# Patient Record
Sex: MALE | Race: WHITE | NOT HISPANIC OR LATINO | Employment: OTHER | URBAN - METROPOLITAN AREA
[De-identification: names, ages, dates, MRNs, and addresses within clinical notes are randomized per-mention and may not be internally consistent; named-entity substitution may affect disease eponyms.]

---

## 2017-03-16 ENCOUNTER — OFFICE VISIT (OUTPATIENT)
Dept: INTERNAL MEDICINE | Facility: CLINIC | Age: 82
End: 2017-03-16
Payer: MEDICARE

## 2017-03-16 VITALS
HEART RATE: 72 BPM | HEIGHT: 71 IN | WEIGHT: 250.25 LBS | SYSTOLIC BLOOD PRESSURE: 122 MMHG | DIASTOLIC BLOOD PRESSURE: 80 MMHG | BODY MASS INDEX: 35.03 KG/M2

## 2017-03-16 DIAGNOSIS — L72.9 CUTANEOUS CYST: Primary | ICD-10-CM

## 2017-03-16 DIAGNOSIS — I10 ESSENTIAL HYPERTENSION: ICD-10-CM

## 2017-03-16 DIAGNOSIS — M25.561 CHRONIC PAIN OF RIGHT KNEE: ICD-10-CM

## 2017-03-16 DIAGNOSIS — G89.29 CHRONIC PAIN OF RIGHT KNEE: ICD-10-CM

## 2017-03-16 DIAGNOSIS — L02.91 ABSCESS: ICD-10-CM

## 2017-03-16 PROCEDURE — 99999 PR PBB SHADOW E&M-EST. PATIENT-LVL III: CPT | Mod: PBBFAC,,, | Performed by: INTERNAL MEDICINE

## 2017-03-16 PROCEDURE — 99213 OFFICE O/P EST LOW 20 MIN: CPT | Mod: PBBFAC | Performed by: INTERNAL MEDICINE

## 2017-03-16 PROCEDURE — 99213 OFFICE O/P EST LOW 20 MIN: CPT | Mod: S$PBB,,, | Performed by: INTERNAL MEDICINE

## 2017-03-16 RX ORDER — CEPHALEXIN 500 MG/1
500 CAPSULE ORAL EVERY 12 HOURS
Qty: 14 CAPSULE | Refills: 0 | Status: SHIPPED | OUTPATIENT
Start: 2017-03-16 | End: 2017-03-23

## 2017-03-16 RX ORDER — ASPIRIN 81 MG/1
81 TABLET ORAL DAILY
COMMUNITY
End: 2018-04-19

## 2017-03-16 NOTE — PROGRESS NOTES
Subjective:       Patient ID: Derik Wolfe is a 81 y.o. male.    Chief Complaint: Recurrent Skin Infections    HPI Comments: HPI: 81-year-old comes in with a cyst or abscess or boil on the upper back.  He says it is just below an area that was previously treated with an incision and drainage.  About 2 days ago it started draining and now is a lot less swollen, less tender but still is mildly red.    Review of Systems   Constitutional: Negative for fatigue and fever.   Skin: Positive for wound (cyst abscess or boil on the upper back). Negative for rash.       Objective:      Physical Exam   Constitutional: He is oriented to person, place, and time. He appears well-developed and well-nourished.   HENT:   Head: Normocephalic and atraumatic.   Neurological: He is alert and oriented to person, place, and time.   Skin: Skin is warm and dry. No rash noted. There is erythema.   See photo   Psychiatric: He has a normal mood and affect. His behavior is normal.             Assessment:       1. Cutaneous cyst    2. Abscess    3. Essential hypertension    4. Chronic pain of right knee        Plan:       Derik was seen today for recurrent skin infections.    Diagnoses and all orders for this visit:    Cutaneous cyst    Abscess    Essential hypertension    Chronic pain of right knee    Other orders  -     cephALEXin (KEFLEX) 500 MG capsule; Take 1 capsule (500 mg total) by mouth every 12 (twelve) hours.        Call if symptoms fail to improve or worsen

## 2017-05-29 RX ORDER — ATENOLOL 25 MG/1
TABLET ORAL
Qty: 90 TABLET | Refills: 0 | Status: SHIPPED | OUTPATIENT
Start: 2017-05-29 | End: 2017-08-26 | Stop reason: SDUPTHER

## 2017-05-29 RX ORDER — ALLOPURINOL 300 MG/1
TABLET ORAL
Qty: 90 TABLET | Refills: 0 | Status: SHIPPED | OUTPATIENT
Start: 2017-05-29 | End: 2017-08-26 | Stop reason: SDUPTHER

## 2017-07-10 ENCOUNTER — OFFICE VISIT (OUTPATIENT)
Dept: DERMATOLOGY | Facility: CLINIC | Age: 82
End: 2017-07-10
Payer: MEDICARE

## 2017-07-10 DIAGNOSIS — L82.1 SK (SEBORRHEIC KERATOSIS): ICD-10-CM

## 2017-07-10 DIAGNOSIS — Z12.83 SKIN CANCER SCREENING: ICD-10-CM

## 2017-07-10 DIAGNOSIS — Z85.828 HISTORY OF NONMELANOMA SKIN CANCER: ICD-10-CM

## 2017-07-10 DIAGNOSIS — L57.0 AK (ACTINIC KERATOSIS): Primary | ICD-10-CM

## 2017-07-10 DIAGNOSIS — L81.4 LENTIGINES: ICD-10-CM

## 2017-07-10 PROCEDURE — 1159F MED LIST DOCD IN RCRD: CPT | Mod: ,,, | Performed by: DERMATOLOGY

## 2017-07-10 PROCEDURE — 99213 OFFICE O/P EST LOW 20 MIN: CPT | Mod: 25,S$PBB,, | Performed by: DERMATOLOGY

## 2017-07-10 PROCEDURE — 17004 DESTROY PREMAL LESIONS 15/>: CPT | Mod: PBBFAC | Performed by: DERMATOLOGY

## 2017-07-10 PROCEDURE — 99213 OFFICE O/P EST LOW 20 MIN: CPT | Mod: PBBFAC | Performed by: DERMATOLOGY

## 2017-07-10 PROCEDURE — 99999 PR PBB SHADOW E&M-EST. PATIENT-LVL III: CPT | Mod: PBBFAC,,, | Performed by: DERMATOLOGY

## 2017-07-10 PROCEDURE — 17004 DESTROY PREMAL LESIONS 15/>: CPT | Mod: S$PBB,,, | Performed by: DERMATOLOGY

## 2017-07-10 PROCEDURE — 1126F AMNT PAIN NOTED NONE PRSNT: CPT | Mod: ,,, | Performed by: DERMATOLOGY

## 2017-07-10 NOTE — PROGRESS NOTES
Subjective:       Patient ID:  Derik Wolfe is a 81 y.o. male who presents for   Chief Complaint   Patient presents with    Skin Check     UBSE     HPI  Patient has a history of non-melanoma skin cancer and is here today for routine skin check.   Interested in upper body skin check today.     Has used efudex in the past on arms and forehead.  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     Pt reports a scaly spot on his L temple which bled occasionally. He tried some efudex cream on it and it went away.    Review of Systems   Constitutional: Negative for fever, chills, weight loss, weight gain, fatigue, night sweats and malaise.   Skin: Negative for daily sunscreen use and recent sunburn.   Hematologic/Lymphatic: Bruises/bleeds easily.        Objective:    Physical Exam   Constitutional: He appears well-developed and well-nourished. No distress.   Neurological: He is alert and oriented to person, place, and time. He is not disoriented.   Psychiatric: He has a normal mood and affect.   Skin:   Areas Examined (abnormalities noted in diagram):   Scalp / Hair Palpated and Inspected  Head / Face Inspection Performed  Neck Inspection Performed  Chest / Axilla Inspection Performed  Abdomen Inspection Performed  Back Inspection Performed  RUE Inspected  LUE Inspection Performed                       Diagram Legend     Erythematous scaling macule/papule c/w actinic keratosis       Vascular papule c/w angioma      Pigmented verrucoid papule/plaque c/w seborrheic keratosis      Yellow umbilicated papule c/w sebaceous hyperplasia      Irregularly shaped tan macule c/w lentigo     1-2 mm smooth white papules consistent with Milia      Movable subcutaneous cyst with punctum c/w epidermal inclusion cyst      Subcutaneous movable cyst c/w pilar cyst      Firm pink to brown papule c/w dermatofibroma      Pedunculated fleshy papule(s) c/w skin tag(s)      Evenly pigmented macule c/w junctional  nevus     Mildly variegated pigmented, slightly irregular-bordered macule c/w mildly atypical nevus      Flesh colored to evenly pigmented papule c/w intradermal nevus       Pink pearly papule/plaque c/w basal cell carcinoma      Erythematous hyperkeratotic cursted plaque c/w SCC      Surgical scar with no sign of skin cancer recurrence      Open and closed comedones      Inflammatory papules and pustules      Verrucoid papule consistent consistent with wart     Erythematous eczematous patches and plaques     Dystrophic onycholytic nail with subungual debris c/w onychomycosis     Umbilicated papule    Erythematous-base heme-crusted tan verrucoid plaque consistent with inflamed seborrheic keratosis     Erythematous Silvery Scaling Plaque c/w Psoriasis     See annotation      Assessment / Plan:        AK (actinic keratosis)  Cryosurgery Procedure Note    Verbal consent from the patient is obtained and the patient is aware of the precancerous quality and need for treatment of these lesions. Liquid nitrogen cryosurgery is applied to the 18 actinic keratoses, as detailed in the physical exam, to produce a freeze injury. The patient is aware that blisters may form and is instructed on wound care with gentle cleansing and use of vaseline ointment to keep moist until healed. The patient is supplied a handout on cryosurgery and is instructed to call if lesions do not completely resolve.    SK (seborrheic keratosis)  These are benign inherited growths without a malignant potential. Reassurance given to patient. No treatment is necessary.   Treatment of benign, asymptomatic lesions may be considered cosmetic.  Warned about risk of hypo- or hyperpigmentation with treatment and risk of recurrence.    Lentigines  These are benign sun spots which should be monitored for changes. Patient instructed in importance of daily broad spectrum sunscreen use with spf at least 30. Sun avoidance and topical protection/protective clothing  discussed.    Skin cancer screening and History of nonmelanoma skin cancer  Upper body skin examination performed today including at least 6 points as noted in physical examination. No lesions suspicious for malignancy noted.  Patient instructed in importance of daily broad spectrum sunscreen use with spf at least 30. Sun avoidance and topical protection/protective clothing discussed.      Return in about 6 months (around 1/10/2018).

## 2017-07-10 NOTE — PATIENT INSTRUCTIONS
SEBORRHEIC KERATOSES        What causes seborrheic keratoses?    Seborrheic keratoses are harmless, common skin growths that first appear during adult life.  As time goes by, more growths appear.  Some persons have a very large number of them.  Seborrheic keratoses appear on both covered and uncovered parts of the body; they are not caused by sunlight.  The tendency to develop seborrheic keratoses is inherited.    Seborrheic keratoses are harmless and never become malignant.  They begin as slightly raised, light brown spots.  Gradually they thicken and take on a rough wartlike surface.  They slowly darken and may turn black.  These color changes are harmless.  Seborrheic keratoses are superficial and look as if they were stuck on the skin.  Persons who have had several seborrheic keratoses can usually recognize this type of benign growth.  However, if you are concerned or unsure about any growth, consult me.    Treatment    Seborrheic keratoses can easily be removed in the office.  The only reason for removing a seborrheic keratosis is your wish to get rid of it.      CRYOSURGERY      Your doctor has used a method called cryosurgery to treat your skin condition. Cryosurgery refers to the use of very cold substances to treat a variety of skin conditions such as warts, pre-skin cancers, molluscum contagiosum, sun spots, and several benign growths. The substance we use in cryosurgery is liquid nitrogen and is so cold (-195 degrees Celsius) that is burns when administered.     Following treatment in the office, the skin may immediately burn and become red. You may find the area around the lesion is affected as well. It is sometimes necessary to treat not only the lesion, but a small area of the surrounding normal skin to achieve a good response.     A blister, and even a blood filled blister, may form after treatment.   This is a normal response. If the blister is painful, it is acceptable to sterilize a needle and with  rubbing alcohol and gently pop the blister. It is important that you gently wash the area with soap and warm water as the blister fluid may contain wart virus if a wart was treated. Do no remove the roof of the blister.     The area treated can take anywhere from 1-3 weeks to heal. Healing time depends on the kind skin lesion treated, the location, and how aggressively the lesion was treated. It is recommended that the areas treated are covered with Vaseline or bacitracin ointment and a band-aid. If a band-aid is not practical, just ointment applied several times per day will do. Keeping these areas moist will speed the healing time.    Treatment with liquid nitrogen can leave a scar. In dark skin, it may be a light or dark scar, in light skin it may be a white or pink scar. These will generally fade with time, but may never go away completely.     If you have any concerns after your treatment, please feel free to call the office.       [address] [place] [address]/ [phone] FAX/ [email or web address]    Summer Sun Protection      The [facility] would like to remind you of the importance of sun protection all year round and particularly during the summer when the suns rays are the strongest. It has been proven that both acute and chronic sun exposure damages our cells and leads to skin cancer. Beyond skin cancer, the sun causes 90% of the symptoms of pre-mature skin aging, including wrinkles, lentigines (brown spots), and thin, easily bruised skin. Proper sun protection can help prevent these unwanted conditions.    Many patients report that the dont go in the sun. It has been shown that the average person receives 18 hours of incidental sun exposure per week during activities such as walking through parking lots, driving, or sitting next to windows. This accumulates to several bad sunburns per year!    In choosing sunscreen, you want one that protects against  both UVA and UVB rays. It is recommended that you use one of SPF 30 or higher. It is important to apply the sunscreen about 20 minutes prior to sun exposure. Most sunscreens are chemical sunscreens and a reaction must take place in the skin so that they are effective. If they are applied and then you are immediately exposed to the sun or start sweating, this reaction has not had time to take place and you are therefore unprotected. Sunscreen needs to be reapplied every 2 hours if you are participating in water sports or sweating. We recommend Elta MD or Neutrogena Ultra Sheer Dry Touch SPF 55 for daily use; however there are many options and it is most important for you to find one that you will use on a consistent basis.    If you have sensitive skin, you may do best with a sunscreen that contains only physical blockers such as titanium dioxide or zinc oxide. These are typically thicker and harder to apply, however they afford very good protection. Neutrogena Sensitive Skin, Blue Lizard Sensitive Skin (pink top) or Neutrogena Pure and Free are popular ones.     Aside from sunscreen, clothes with UV protection, wide brimmed hats, and sunglasses are other means of sun protection that we recommend.                        [facility] - DERMATOLOGY  [address]  [place] [address]  Dept: [phone]  Dept Fax: [phone]

## 2017-08-27 RX ORDER — ATENOLOL 25 MG/1
TABLET ORAL
Qty: 90 TABLET | Refills: 0 | Status: SHIPPED | OUTPATIENT
Start: 2017-08-27 | End: 2017-08-29

## 2017-08-27 RX ORDER — ALLOPURINOL 300 MG/1
TABLET ORAL
Qty: 90 TABLET | Refills: 0 | Status: SHIPPED | OUTPATIENT
Start: 2017-08-27 | End: 2017-11-29 | Stop reason: SDUPTHER

## 2017-08-29 ENCOUNTER — TELEPHONE (OUTPATIENT)
Dept: INTERNAL MEDICINE | Facility: CLINIC | Age: 82
End: 2017-08-29

## 2017-08-29 RX ORDER — METOPROLOL SUCCINATE 25 MG/1
25 TABLET, EXTENDED RELEASE ORAL DAILY
Qty: 30 TABLET | Refills: 11 | Status: SHIPPED | OUTPATIENT
Start: 2017-08-29 | End: 2018-06-04

## 2017-11-29 ENCOUNTER — PATIENT MESSAGE (OUTPATIENT)
Dept: INTERNAL MEDICINE | Facility: CLINIC | Age: 82
End: 2017-11-29

## 2017-11-29 RX ORDER — ALLOPURINOL 300 MG/1
TABLET ORAL
Qty: 90 TABLET | Refills: 0 | Status: SHIPPED | OUTPATIENT
Start: 2017-11-29 | End: 2017-11-29 | Stop reason: SDUPTHER

## 2017-11-29 RX ORDER — ALLOPURINOL 300 MG/1
300 TABLET ORAL DAILY
Qty: 90 TABLET | Refills: 0 | Status: SHIPPED | OUTPATIENT
Start: 2017-11-29 | End: 2017-11-29 | Stop reason: SDUPTHER

## 2017-11-29 RX ORDER — ALLOPURINOL 300 MG/1
300 TABLET ORAL DAILY
Qty: 90 TABLET | Refills: 0 | Status: SHIPPED | OUTPATIENT
Start: 2017-11-29 | End: 2018-06-04 | Stop reason: SDUPTHER

## 2018-01-08 ENCOUNTER — CLINICAL SUPPORT (OUTPATIENT)
Dept: URGENT CARE | Facility: CLINIC | Age: 83
End: 2018-01-08
Payer: MEDICARE

## 2018-01-08 DIAGNOSIS — Z23 INFLUENZA VACCINE ADMINISTERED: Primary | ICD-10-CM

## 2018-01-08 PROCEDURE — 90662 IIV NO PRSV INCREASED AG IM: CPT | Mod: S$GLB,,, | Performed by: INTERNAL MEDICINE

## 2018-01-08 PROCEDURE — G0008 ADMIN INFLUENZA VIRUS VAC: HCPCS | Mod: S$GLB,,, | Performed by: INTERNAL MEDICINE

## 2018-02-01 ENCOUNTER — OFFICE VISIT (OUTPATIENT)
Dept: DERMATOLOGY | Facility: CLINIC | Age: 83
End: 2018-02-01
Payer: MEDICARE

## 2018-02-01 DIAGNOSIS — L82.1 SK (SEBORRHEIC KERATOSIS): ICD-10-CM

## 2018-02-01 DIAGNOSIS — D18.00 ANGIOMA: ICD-10-CM

## 2018-02-01 DIAGNOSIS — L72.0 MILIA: ICD-10-CM

## 2018-02-01 DIAGNOSIS — L57.0 AK (ACTINIC KERATOSIS): Primary | ICD-10-CM

## 2018-02-01 DIAGNOSIS — Z12.83 SKIN CANCER SCREENING: ICD-10-CM

## 2018-02-01 PROCEDURE — 99999 PR PBB SHADOW E&M-EST. PATIENT-LVL II: CPT | Mod: PBBFAC,,, | Performed by: DERMATOLOGY

## 2018-02-01 PROCEDURE — 99213 OFFICE O/P EST LOW 20 MIN: CPT | Mod: 25,S$PBB,, | Performed by: DERMATOLOGY

## 2018-02-01 PROCEDURE — 99212 OFFICE O/P EST SF 10 MIN: CPT | Mod: PBBFAC | Performed by: DERMATOLOGY

## 2018-02-01 PROCEDURE — 17004 DESTROY PREMAL LESIONS 15/>: CPT | Mod: PBBFAC | Performed by: DERMATOLOGY

## 2018-02-01 PROCEDURE — 17004 DESTROY PREMAL LESIONS 15/>: CPT | Mod: S$PBB,,, | Performed by: DERMATOLOGY

## 2018-02-01 PROCEDURE — 1126F AMNT PAIN NOTED NONE PRSNT: CPT | Mod: ,,, | Performed by: DERMATOLOGY

## 2018-02-01 PROCEDURE — 1159F MED LIST DOCD IN RCRD: CPT | Mod: ,,, | Performed by: DERMATOLOGY

## 2018-02-01 NOTE — PROGRESS NOTES
Subjective:       Patient ID:  Derik Wolfe is a 82 y.o. male who presents for   Chief Complaint   Patient presents with    Skin Check     UBSE     HPI  Patient has a history of non-melanoma skin cancer and is here today for routine skin check.   Interested in upper body skin check today.     Has used efudex in the past on arms and forehead.  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     Pt reports a bump on the right side of his nose since last visit. Asymptomatic and no prior treatment.    Review of Systems   Constitutional: Negative for fever, chills, weight loss, weight gain, fatigue, night sweats and malaise.   Skin: Negative for daily sunscreen use and recent sunburn.   Hematologic/Lymphatic: Bruises/bleeds easily.        Objective:    Physical Exam   Constitutional: He appears well-developed and well-nourished. No distress.   Neurological: He is alert and oriented to person, place, and time. He is not disoriented.   Psychiatric: He has a normal mood and affect.   Skin:   Areas Examined (abnormalities noted in diagram):   Scalp / Hair Palpated and Inspected  Head / Face Inspection Performed  Neck Inspection Performed  Chest / Axilla Inspection Performed  Abdomen Inspection Performed  Back Inspection Performed  RUE Inspected  LUE Inspection Performed                   Diagram Legend     Erythematous scaling macule/papule c/w actinic keratosis       Vascular papule c/w angioma      Pigmented verrucoid papule/plaque c/w seborrheic keratosis      Yellow umbilicated papule c/w sebaceous hyperplasia      Irregularly shaped tan macule c/w lentigo     1-2 mm smooth white papules consistent with Milia      Movable subcutaneous cyst with punctum c/w epidermal inclusion cyst      Subcutaneous movable cyst c/w pilar cyst      Firm pink to brown papule c/w dermatofibroma      Pedunculated fleshy papule(s) c/w skin tag(s)      Evenly pigmented macule c/w junctional nevus     Mildly  variegated pigmented, slightly irregular-bordered macule c/w mildly atypical nevus      Flesh colored to evenly pigmented papule c/w intradermal nevus       Pink pearly papule/plaque c/w basal cell carcinoma      Erythematous hyperkeratotic cursted plaque c/w SCC      Surgical scar with no sign of skin cancer recurrence      Open and closed comedones      Inflammatory papules and pustules      Verrucoid papule consistent consistent with wart     Erythematous eczematous patches and plaques     Dystrophic onycholytic nail with subungual debris c/w onychomycosis     Umbilicated papule    Erythematous-base heme-crusted tan verrucoid plaque consistent with inflamed seborrheic keratosis     Erythematous Silvery Scaling Plaque c/w Psoriasis     See annotation      Assessment / Plan:        AK (actinic keratosis)  Cryosurgery Procedure Note    Verbal consent from the patient is obtained and the patient is aware of the precancerous quality and need for treatment of these lesions. Liquid nitrogen cryosurgery is applied to the 23 actinic keratoses, as detailed in the physical exam, to produce a freeze injury. The patient is aware that blisters may form and is instructed on wound care with gentle cleansing and use of vaseline ointment to keep moist until healed. The patient is supplied a handout on cryosurgery and is instructed to call if lesions do not completely resolve.    Pt will consider using efudex again in the future, but preferred cryo today.    SK (seborrheic keratosis)  These are benign inherited growths without a malignant potential. Reassurance given to patient. No treatment is necessary.   Treatment of benign, asymptomatic lesions may be considered cosmetic.  Warned about risk of hypo- or hyperpigmentation with treatment and risk of recurrence.    Milia  This is a benign cyst. Reassurance provided. No treatment is necessary unless it is symptomatic. These may resolve on their own.    Angioma  This is a benign  vascular lesion. Reassurance given. No treatment required. Treatment of benign, asymptomatic lesions may be considered cosmetic.    Skin cancer screening  Upper body skin examination performed today including at least 6 points as noted in physical examination. No lesions suspicious for malignancy noted.  Patient instructed in importance of daily broad spectrum sunscreen use with spf at least 30. Sun avoidance and topical protection/protective clothing discussed.      Follow-up in about 4 months (around 6/1/2018) for skin check or sooner for any concerns.

## 2018-02-01 NOTE — PATIENT INSTRUCTIONS
CRYOSURGERY      Your doctor has used a method called cryosurgery to treat your skin condition. Cryosurgery refers to the use of very cold substances to treat a variety of skin conditions such as warts, pre-skin cancers, molluscum contagiosum, sun spots, and several benign growths. The substance we use in cryosurgery is liquid nitrogen and is so cold (-195 degrees Celsius) that is burns when administered.     Following treatment in the office, the skin may immediately burn and become red. You may find the area around the lesion is affected as well. It is sometimes necessary to treat not only the lesion, but a small area of the surrounding normal skin to achieve a good response.     A blister, and even a blood filled blister, may form after treatment.   This is a normal response. If the blister is painful, it is acceptable to sterilize a needle and with rubbing alcohol and gently pop the blister. It is important that you gently wash the area with soap and warm water as the blister fluid may contain wart virus if a wart was treated. Do no remove the roof of the blister.     The area treated can take anywhere from 1-3 weeks to heal. Healing time depends on the kind skin lesion treated, the location, and how aggressively the lesion was treated. It is recommended that the areas treated are covered with Vaseline or bacitracin ointment and a band-aid. If a band-aid is not practical, just ointment applied several times per day will do. Keeping these areas moist will speed the healing time.    Treatment with liquid nitrogen can leave a scar. In dark skin, it may be a light or dark scar, in light skin it may be a white or pink scar. These will generally fade with time, but may never go away completely.     If you have any concerns after your treatment, please feel free to call the office.       [address] [place] [address]/ [phone] FAX/ [email or web address]    Summer Sun Protection      The  [facility] would like to remind you of the importance of sun protection all year round and particularly during the summer when the suns rays are the strongest. It has been proven that both acute and chronic sun exposure damages our cells and leads to skin cancer. Beyond skin cancer, the sun causes 90% of the symptoms of pre-mature skin aging, including wrinkles, lentigines (brown spots), and thin, easily bruised skin. Proper sun protection can help prevent these unwanted conditions.    Many patients report that the dont go in the sun. It has been shown that the average person receives 18 hours of incidental sun exposure per week during activities such as walking through parking lots, driving, or sitting next to windows. This accumulates to several bad sunburns per year!    In choosing sunscreen, you want one that protects against both UVA and UVB rays. It is recommended that you use one of SPF 30 or higher. It is important to apply the sunscreen about 20 minutes prior to sun exposure. Most sunscreens are chemical sunscreens and a reaction must take place in the skin so that they are effective. If they are applied and then you are immediately exposed to the sun or start sweating, this reaction has not had time to take place and you are therefore unprotected. Sunscreen needs to be reapplied every 2 hours if you are participating in water sports or sweating. We recommend Elta MD or Neutrogena Ultra Sheer Dry Touch SPF 55 for daily use; however there are many options and it is most important for you to find one that you will use on a consistent basis.    If you have sensitive skin, you may do best with a sunscreen that contains only physical blockers such as titanium dioxide or zinc oxide. These are typically thicker and harder to apply, however they afford very good protection. Neutrogena Sensitive Skin, Blue Lizard Sensitive Skin (pink top) or Neutrogena Pure and Free are popular ones.      Aside from sunscreen, clothes with UV protection, wide brimmed hats, and sunglasses are other means of sun protection that we recommend.                        [facility] - DERMATOLOG[facility]  [address]  [facility] [address]  Dept: [phone]  Dept Fax: [phone]

## 2018-04-05 DIAGNOSIS — M25.561 PAIN IN BOTH KNEES, UNSPECIFIED CHRONICITY: Primary | ICD-10-CM

## 2018-04-05 DIAGNOSIS — M25.562 PAIN IN BOTH KNEES, UNSPECIFIED CHRONICITY: Primary | ICD-10-CM

## 2018-04-06 ENCOUNTER — OFFICE VISIT (OUTPATIENT)
Dept: INTERNAL MEDICINE | Facility: CLINIC | Age: 83
End: 2018-04-06
Payer: MEDICARE

## 2018-04-06 VITALS
BODY MASS INDEX: 35.22 KG/M2 | OXYGEN SATURATION: 95 % | HEIGHT: 71 IN | DIASTOLIC BLOOD PRESSURE: 64 MMHG | SYSTOLIC BLOOD PRESSURE: 122 MMHG | HEART RATE: 76 BPM | WEIGHT: 251.56 LBS

## 2018-04-06 DIAGNOSIS — H61.22 IMPACTED CERUMEN OF LEFT EAR: Primary | ICD-10-CM

## 2018-04-06 DIAGNOSIS — I10 ESSENTIAL HYPERTENSION: ICD-10-CM

## 2018-04-06 PROCEDURE — 99213 OFFICE O/P EST LOW 20 MIN: CPT | Mod: S$PBB,,, | Performed by: INTERNAL MEDICINE

## 2018-04-06 PROCEDURE — 99213 OFFICE O/P EST LOW 20 MIN: CPT | Mod: PBBFAC | Performed by: INTERNAL MEDICINE

## 2018-04-06 PROCEDURE — 99999 PR PBB SHADOW E&M-EST. PATIENT-LVL III: CPT | Mod: PBBFAC,,, | Performed by: INTERNAL MEDICINE

## 2018-04-06 RX ORDER — NEOMYCIN SULFATE, POLYMYXIN B SULFATE AND HYDROCORTISONE 10; 3.5; 1 MG/ML; MG/ML; [USP'U]/ML
3 SUSPENSION/ DROPS AURICULAR (OTIC) 4 TIMES DAILY
Qty: 10 ML | Refills: 1 | Status: SHIPPED | OUTPATIENT
Start: 2018-04-06

## 2018-04-06 NOTE — PROGRESS NOTES
Subjective:       Patient ID: Derik Wolfe is a 82 y.o. male.    Chief Complaint: Ear Fullness (wax in ears)     HPI: Pt c/o wax in his ear. 2 times in the past he had his ears cleaned by ENT. He was told to use Cortisporin Otic drops and they were able to clean the ears successfully. He could not get an appt for weeks.  He did not have any drops and the left ear is clogged. No fever or sore throat or any other issues.       Ear Fullness    Associated symptoms include hearing loss. Pertinent negatives include no abdominal pain, coughing, diarrhea, headaches, neck pain, rash, rhinorrhea or vomiting.     Review of Systems   Constitutional: Negative for activity change, chills, fatigue, fever and unexpected weight change.   HENT: Positive for hearing loss. Negative for nosebleeds, rhinorrhea and trouble swallowing.    Eyes: Negative for pain, discharge and visual disturbance.   Respiratory: Negative for cough, chest tightness, shortness of breath and wheezing.    Cardiovascular: Negative for chest pain and palpitations.   Gastrointestinal: Negative for abdominal pain, blood in stool, constipation, diarrhea, nausea and vomiting.   Endocrine: Negative for polydipsia and polyuria.   Genitourinary: Negative for difficulty urinating, hematuria and urgency.   Musculoskeletal: Negative for arthralgias, joint swelling and neck pain.   Skin: Negative for rash.   Neurological: Negative for dizziness, weakness and headaches.   Hematological: Does not bruise/bleed easily.   Psychiatric/Behavioral: Negative for confusion, dysphoric mood, sleep disturbance and suicidal ideas.       Objective:      Physical Exam   Constitutional: He appears well-developed and well-nourished.   HENT:   Head: Normocephalic and atraumatic.   Right Ear: External ear normal.   Mouth/Throat: Oropharynx is clear and moist.   Left ear completely occluded with wax.    Neck: Normal range of motion. Neck supple. No thyromegaly present.   Lymphadenopathy:      He has no cervical adenopathy.     I tried to remove the wax but it was entirely too hard and unable to be removed. Canal was mildly irritated.   Assessment:       1. Impacted cerumen of left ear    2. Essential hypertension        Plan:       Derik was seen today for ear fullness.    Diagnoses and all orders for this visit:    Impacted cerumen of left ear  -     Ambulatory referral to ENT    Essential hypertension    Other orders  -     neomycin-polymyxin-hydrocortisone (CORTISPORIN) 3.5-10,000-1 mg/mL-unit/mL-% otic suspension; Place 3 drops into the left ear 4 (four) times daily.     Restart cortisporin a few days and see if that helps or if the ear needs to be cleaned here or in ENT>

## 2018-04-09 ENCOUNTER — OFFICE VISIT (OUTPATIENT)
Dept: ORTHOPEDICS | Facility: CLINIC | Age: 83
End: 2018-04-09
Payer: MEDICARE

## 2018-04-09 ENCOUNTER — HOSPITAL ENCOUNTER (OUTPATIENT)
Dept: RADIOLOGY | Facility: HOSPITAL | Age: 83
Discharge: HOME | End: 2018-04-09
Attending: ORTHOPAEDIC SURGERY
Payer: MEDICARE

## 2018-04-09 VITALS — WEIGHT: 248.44 LBS | BODY MASS INDEX: 34.78 KG/M2 | HEIGHT: 71 IN

## 2018-04-09 DIAGNOSIS — M25.561 PAIN IN BOTH KNEES, UNSPECIFIED CHRONICITY: ICD-10-CM

## 2018-04-09 DIAGNOSIS — M17.11 PRIMARY OSTEOARTHRITIS OF RIGHT KNEE: Primary | ICD-10-CM

## 2018-04-09 DIAGNOSIS — M25.562 PAIN IN BOTH KNEES, UNSPECIFIED CHRONICITY: ICD-10-CM

## 2018-04-09 PROCEDURE — 99999 PR PBB SHADOW E&M-EST. PATIENT-LVL II: CPT | Mod: PBBFAC,,, | Performed by: ORTHOPAEDIC SURGERY

## 2018-04-09 PROCEDURE — 73562 X-RAY EXAM OF KNEE 3: CPT | Mod: 26,50,, | Performed by: RADIOLOGY

## 2018-04-09 PROCEDURE — 73562 X-RAY EXAM OF KNEE 3: CPT | Mod: TC,50

## 2018-04-09 PROCEDURE — 99212 OFFICE O/P EST SF 10 MIN: CPT | Mod: PBBFAC,25 | Performed by: ORTHOPAEDIC SURGERY

## 2018-04-09 PROCEDURE — 99203 OFFICE O/P NEW LOW 30 MIN: CPT | Mod: S$PBB,,, | Performed by: ORTHOPAEDIC SURGERY

## 2018-04-09 RX ORDER — HYALURONATE SODIUM 20 MG/2 ML
20 SYRINGE (ML) INTRAARTICULAR WEEKLY
Status: SHIPPED | OUTPATIENT
Start: 2018-04-09

## 2018-04-09 NOTE — PROGRESS NOTES
Subjective:      Patient ID: Derik Wolfe is a 82 y.o. male.    Chief Complaint: Pain of the Right Knee    HPI Derik Wolfe is a 82 year old male here with a 2-3 year history of right knee pain. The patient is a retired liquor salesman. There was not a history of trauma.  The pain is moderate. The pain is located in the medial and lateral aspects of the knee. There is not radiation. The pain is described as dull. The patient has had prior surgery (right knee arthroscopy ~25 years ago for ?patellar instability). It is aggravated with moderate activity.  It is alleviated by rest. There is not numbness or tingling of the lower extremity.  There is not back pain.  He has tried medications (NSAID's) but not injections. They have helped.  He does have difficulty getting in or out of a car, getting dressed, or going up or down stairs.  The patient does not use an assistive device.    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       Past Surgical History:   Procedure Laterality Date    KNEE ARTHROSCOPY         Family History   Problem Relation Age of Onset    Melanoma Neg Hx        Social History     Social History    Marital status:      Spouse name: N/A    Number of children: N/A    Years of education: N/A     Social History Main Topics    Smoking status: Former Smoker    Smokeless tobacco: Never Used      Comment: quit in 1980's    Alcohol use 0.6 oz/week     1 Cans of beer per week    Drug use: No    Sexual activity: Yes     Partners: Female     Other Topics Concern    None     Social History Narrative    None       Current Outpatient Prescriptions   Medication Sig Dispense Refill    allopurinol (ZYLOPRIM) 300 MG tablet Take 1 tablet (300 mg total) by mouth once daily. 90 tablet 0    metoprolol  "succinate (TOPROL-XL) 25 MG 24 hr tablet Take 1 tablet (25 mg total) by mouth once daily. 30 tablet 11    neomycin-polymyxin-hydrocortisone (CORTISPORIN) 3.5-10,000-1 mg/mL-unit/mL-% otic suspension Place 3 drops into the left ear 4 (four) times daily. 10 mL 1    aspirin (ECOTRIN) 81 MG EC tablet Take 81 mg by mouth once daily.       Current Facility-Administered Medications   Medication Dose Route Frequency Provider Last Rate Last Dose    EUFLEXXA 10 mg/mL(mw 2.4 -3.6 million) injection Syrg 20 mg  20 mg Intra-articular Weekly Major Rojas MD           Review of patient's allergies indicates:   Allergen Reactions    Codeine Other (See Comments)           Review of Systems   Constitution: Negative for chills, fever and night sweats.   HENT: Negative for hearing loss.    Eyes: Negative for blurred vision and double vision.   Cardiovascular: Negative for chest pain, claudication and leg swelling.   Respiratory: Negative for shortness of breath.    Endocrine: Negative for polydipsia, polyphagia and polyuria.   Hematologic/Lymphatic: Negative for adenopathy and bleeding problem. Does not bruise/bleed easily.   Skin: Negative for poor wound healing and rash.   Musculoskeletal: Positive for joint pain. Negative for joint swelling.   Gastrointestinal: Negative for abdominal pain, diarrhea, heartburn, nausea and vomiting.   Genitourinary: Negative for bladder incontinence.   Neurological: Negative for focal weakness, headaches, numbness, paresthesias and sensory change.   Psychiatric/Behavioral: The patient is not nervous/anxious.    Allergic/Immunologic: Negative for persistent infections.   All other systems reviewed and are negative.        Objective:      Estimated body mass index is 34.65 kg/m² as calculated from the following:    Height as of this encounter: 5' 11" (1.803 m).    Weight as of this encounter: 112.7 kg (248 lb 7.3 oz).      General    Constitutional: He is oriented to person, place, and time. " He appears well-developed and well-nourished. No distress.   HENT:   Head: Normocephalic and atraumatic.   Eyes: EOM are normal.   Cardiovascular: Normal rate and intact distal pulses.    Pulmonary/Chest: Effort normal.   Neurological: He is alert and oriented to person, place, and time.   Psychiatric: He has a normal mood and affect. His behavior is normal.           Right Knee Exam   Right knee exam is normal.    Inspection   Erythema: absent  Scars: present  Swelling: absent  Effusion: effusion  Deformity: deformity  Bruising: absent    Tenderness   The patient is tender to palpation of the medial joint line and lateral joint line.    Crepitus   The patient has crepitus of the patella.    Range of Motion   Extension: 0   Flexion: 120     Tests   Meniscus   Anju:  Medial - negative Lateral - negative  Ligament Examination Lachman: normal (-1 to 2mm)   MCL - Valgus: normal (0 to 2mm)  LCL - Varus: normal  Patella   Patellar Tracking: normal    Other   Sensation: normal    Left Knee Exam     Inspection   Erythema: absent  Scars: absent  Swelling: absent  Effusion: absent  Deformity: deformity  Bruising: absent    Tenderness   The patient is experiencing no tenderness.         Crepitus   The patient has crepitus of the patella.    Range of Motion   Extension: 0   Flexion: 120     Tests   Meniscus   Anju:  Medial - negative Lateral - negative  Stability Lachman: normal (-1 to 2mm)   MCL - Valgus: normal (0 to 2mm)  LCL - Varus: normal (0 to 2mm)  Patella   Patellar Tracking: normal    Other   Sensation: normal    Muscle Strength   Right Lower Extremity   Hip Abduction: 5/5   Quadriceps:  5/5   Hamstrin/5   Left Lower Extremity   Hip Abduction: 5/5   Quadriceps:  5/5   Hamstrin/5     Vascular Exam     Right Pulses  Dorsalis Pedis:      2+          Left Pulses  Dorsalis Pedis:      2+          Edema  Right Lower Leg: absent  Left Lower Leg: absent      Radiographs taken today and reviewed by me  demonstrate moderate arthritic change of the bilateral KNEE(s).There  is not bone destruction.  There is not a fracture. The medial compartment is most involved.  There is a varus deformity.  The changes are tricompartmental.          Assessment:       Encounter Diagnosis   Name Primary?    Primary osteoarthritis of right knee Yes          Plan:       Derik was seen today for pain.    Diagnoses and all orders for this visit:    Primary osteoarthritis of right knee  -     Prior Authorization Order    Other orders  -     EUFLEXXA 10 mg/mL(mw 2.4 -3.6 million) injection Syrg 20 mg; Inject 2 mLs (20 mg total) into the articular space once a week.    Treatment options have been discussed.  We have decided to proceed with Euflexxa injections.  The risk of pseudoseptic reactions were discussed, as was the minimal risk of infection.  Derik SMITH Yaneth will return for his first injection..

## 2018-04-16 ENCOUNTER — OFFICE VISIT (OUTPATIENT)
Dept: ORTHOPEDICS | Facility: CLINIC | Age: 83
End: 2018-04-16
Payer: MEDICARE

## 2018-04-16 DIAGNOSIS — M17.11 PRIMARY OSTEOARTHRITIS OF RIGHT KNEE: Primary | ICD-10-CM

## 2018-04-16 PROCEDURE — 20610 DRAIN/INJ JOINT/BURSA W/O US: CPT | Mod: S$PBB,RT,, | Performed by: PHYSICIAN ASSISTANT

## 2018-04-16 PROCEDURE — 99212 OFFICE O/P EST SF 10 MIN: CPT | Mod: PBBFAC,25 | Performed by: PHYSICIAN ASSISTANT

## 2018-04-16 PROCEDURE — 99499 UNLISTED E&M SERVICE: CPT | Mod: S$PBB,,, | Performed by: PHYSICIAN ASSISTANT

## 2018-04-16 PROCEDURE — 20610 DRAIN/INJ JOINT/BURSA W/O US: CPT | Mod: PBBFAC | Performed by: PHYSICIAN ASSISTANT

## 2018-04-16 PROCEDURE — 99999 PR PBB SHADOW E&M-EST. PATIENT-LVL II: CPT | Mod: PBBFAC,,, | Performed by: PHYSICIAN ASSISTANT

## 2018-04-16 RX ADMIN — Medication 20 MG: at 12:04

## 2018-04-16 NOTE — LETTER
April 16, 2018      Major Rojas MD  [address]  [facility] [address]           [facility] - Orthopedics  [address]  [facility] [address]  Phone: [phone]          Patient: Derik Wolfe   MR Number: [id number]   YOB: 1935   Date of Visit: 4/16/2018       Dear Dr. Major Rojas:    Thank you for referring Derik Wolfe to me for evaluation. Attached you will find relevant portions of my assessment and plan of care.    If you have questions, please do not hesitate to call me. I look forward to following Derik Wolfe along with you.    Sincerely,    Irene Gaviria PA-C    Enclosure  CC:  No Recipients    If you would like to receive this communication electronically, please contact [email or web address] or [phone] to request more information on [employer] Link access.    For providers and/or their staff who would like to refer a patient to [facility], please contact us through our one-stop-shop provider referral line, [facility] , at [phone].    If you feel you have received this communication in error or would no longer like to receive these types of communications, please e-mail [email or web address]

## 2018-04-16 NOTE — PROGRESS NOTES
Derik Wolfe presents to clinic today for the first right knee Euflexxa injection.    Exam demonstrates the no effusion in the  right knee, and the skin is intact.    Diagnosis: osteoarthritis knee    After time out was performed and patient ID, side, and site were verified, the  right  knee was sterilly prepped in the standard fashion.  A 22-gauge needle was introduced into right knee joint from an lata-lateral site without complication and knee was then injected with 2 ml of Euflexxa.  Sterile dressing was applied.  The patient was instructed to resume activities as tolerated and to call with any problems.     We will see [place] Yaneth back next week for the second injection.

## 2018-04-19 ENCOUNTER — CLINICAL SUPPORT (OUTPATIENT)
Dept: AUDIOLOGY | Facility: CLINIC | Age: 83
End: 2018-04-19
Payer: MEDICARE

## 2018-04-19 ENCOUNTER — OFFICE VISIT (OUTPATIENT)
Dept: OTOLARYNGOLOGY | Facility: CLINIC | Age: 83
End: 2018-04-19
Payer: MEDICARE

## 2018-04-19 VITALS
SYSTOLIC BLOOD PRESSURE: 157 MMHG | DIASTOLIC BLOOD PRESSURE: 73 MMHG | HEART RATE: 76 BPM | BODY MASS INDEX: 35.12 KG/M2 | TEMPERATURE: 98 F | HEIGHT: 71 IN | WEIGHT: 250.88 LBS

## 2018-04-19 DIAGNOSIS — H90.3 HEARING LOSS, SENSORINEURAL, HIGH FREQUENCY, BILATERAL: ICD-10-CM

## 2018-04-19 DIAGNOSIS — H90.3 SENSORINEURAL HEARING LOSS, BILATERAL: Primary | ICD-10-CM

## 2018-04-19 DIAGNOSIS — H61.22 HEARING LOSS DUE TO CERUMEN IMPACTION, LEFT: Primary | ICD-10-CM

## 2018-04-19 DIAGNOSIS — H61.21 IMPACTED CERUMEN OF RIGHT EAR: ICD-10-CM

## 2018-04-19 PROCEDURE — 99213 OFFICE O/P EST LOW 20 MIN: CPT | Mod: PBBFAC,25,27 | Performed by: OTOLARYNGOLOGY

## 2018-04-19 PROCEDURE — 99999 PR PBB SHADOW E&M-EST. PATIENT-LVL III: CPT | Mod: PBBFAC,,, | Performed by: OTOLARYNGOLOGY

## 2018-04-19 PROCEDURE — 99999 PR PBB SHADOW E&M-EST. PATIENT-LVL I: CPT | Mod: PBBFAC,,,

## 2018-04-19 PROCEDURE — 69210 REMOVE IMPACTED EAR WAX UNI: CPT | Mod: S$PBB,,, | Performed by: OTOLARYNGOLOGY

## 2018-04-19 PROCEDURE — 69210 REMOVE IMPACTED EAR WAX UNI: CPT | Mod: 50,PBBFAC | Performed by: OTOLARYNGOLOGY

## 2018-04-19 PROCEDURE — 99211 OFF/OP EST MAY X REQ PHY/QHP: CPT | Mod: 25,S$PBB,, | Performed by: OTOLARYNGOLOGY

## 2018-04-19 PROCEDURE — 92557 COMPREHENSIVE HEARING TEST: CPT | Mod: PBBFAC | Performed by: AUDIOLOGIST

## 2018-04-19 PROCEDURE — 99211 OFF/OP EST MAY X REQ PHY/QHP: CPT | Mod: PBBFAC,25

## 2018-04-19 NOTE — PATIENT INSTRUCTIONS
C.I. extracted from deep  AD eac with microforceps  Liquidy ( pre-treated)  cerumen suctioned from deep AS eac  Yearly ear cleaning recommended  Audiometry reviewed:  Hearing protection encouraged prn  Monitor hearing q 2-3 years

## 2018-04-19 NOTE — PROGRESS NOTES
Subjective:       Patient ID: Derik Wolfe is a 82 y.o. male.    Chief Complaint: No chief complaint on file.    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 VIPIN Dowell here in August 2009.  He is here for an ear cleaning procedure today.  He believes she can hear well in general however.   He denies any history is of ear pain, ear pressure, ear ringing, ear discharge, ear infections or exposure to ototoxic drugs.  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    Cardiovascular:  Positive for history of high blood pressure.   Other:  Positive for arthritis. Negative for rash.    His medical problem list all E includes hypertension  Current Outpatient Prescriptions on File Prior to Visit   Medication Sig Dispense Refill    allopurinol (ZYLOPRIM) 300 MG tablet Take 1 tablet (300 mg total) by mouth once daily. 90 tablet 0    metoprolol succinate (TOPROL-XL) 25 MG 24 hr tablet Take 1 tablet (25 mg total) by mouth once daily. 30 tablet 11    neomycin-polymyxin-hydrocortisone (CORTISPORIN) 3.5-10,000-1 mg/mL-unit/mL-% otic suspension Place 3 drops into the left ear 4 (four) times daily. 10 mL 1     Current Facility-Administered Medications on File Prior to Visit   Medication Dose Route Frequency Provider Last Rate Last Dose    EUFLEXXA 10 mg/mL(mw 2.4 -3.6 million) injection Syrg 20 mg  20 mg Intra-articular Weekly Major Rojas,  "MD   20 mg at 04/16/18 0395           The patient completed an audiometric study performed by the [facility] audiology service after his ears were cleaned  The study is duplicated below and the results are reviewed with the patient.  Objective:            /73 P 76 T 98.0 Ht. 5'11" Wt 250 lbs  Gen.:  Alert and oriented gentleman in no acute distress; he is not hearing impaired  Both ears are examined under the microscope in the microproedure room  Physical Exam   Constitutional: He is oriented to person, place, and time. He appears well-developed and well-nourished.   HENT:   Head: Normocephalic.   Right Ear: Hearing, tympanic membrane and ear canal normal. No drainage. No foreign bodies. No mastoid tenderness. Tympanic membrane is not perforated. No decreased hearing is noted.   Left Ear: Hearing, tympanic membrane and ear canal normal. No drainage. No foreign bodies. No mastoid tenderness. Tympanic membrane is not perforated. No decreased hearing is noted.   Ears:    Nose: No nose lacerations, nasal deformity, septal deviation or nasal septal hematoma. No epistaxis. Right sinus exhibits no maxillary sinus tenderness and no frontal sinus tenderness. Left sinus exhibits no maxillary sinus tenderness and no frontal sinus tenderness.   Mouth/Throat: Uvula is midline, oropharynx is clear and moist and mucous membranes are normal. He does not have dentures. No oral lesions. No trismus in the jaw. No uvula swelling or dental caries. No oropharyngeal exudate or tonsillar abscesses.   Neck: No thyromegaly present.   Pulmonary/Chest: Effort normal. No stridor.   Lymphadenopathy:     He has no cervical adenopathy.   Neurological: He is alert and oriented to person, place, and time.   Skin: No rash noted.   Psychiatric: His behavior is normal.       Assessment:       1. Hearing loss due to cerumen impaction, left    2. Impacted cerumen of right ear    3. Hearing loss, sensorineural, high frequency, bilateral        Plan:     " C.I. extracted from deep  AD eac with microforceps  Liquidy ( pre-treated)  cerumen suctioned from deep AS eac  Yearly ear cleaning recommended  Audiometry reviewed:  Hearing protection encouraged prn  Monitor hearing q 2-3 years

## 2018-04-19 NOTE — PROGRESS NOTES
pls see below audio results.  Pt does not complain of hearing problems.  Dr. Peterson requests audio to establish baseline measures.  Mild - moderate HF SNHL AU.  Annual audiograms and hearing protection if around hazardous noises.

## 2018-04-19 NOTE — LETTER
April 22, 2018      Girish Seals MD  [address]  [facility] [address]           [facility]  [address]  [facility] [address]  Phone: [phone]  Fax: [phone]          Patient: Derik Wolfe   MR Number: [id number]   YOB: 1935   Date of Visit: 4/19/2018       Dear Dr. Girish Seals:    Thank you for referring Derik Wolfe to me for evaluation. Attached you will find relevant portions of my assessment and plan of care.    If you have questions, please do not hesitate to call me. I look forward to following Derik Wolfe along with you.    Sincerely,        Enclosure  CC:  No Recipients    If you would like to receive this communication electronically, please contact [email or web address] or [phone] to request more information on [employer] Link access.    For providers and/or their staff who would like to refer a patient to [facility], please contact us through our one-stop-shop provider referral line, [facility] , at [phone].    If you feel you have received this communication in error or would no longer like to receive these types of communications, please e-mail [email or web address]

## 2018-04-23 ENCOUNTER — OFFICE VISIT (OUTPATIENT)
Dept: ORTHOPEDICS | Facility: CLINIC | Age: 83
End: 2018-04-23
Payer: MEDICARE

## 2018-04-23 VITALS — HEIGHT: 71 IN | BODY MASS INDEX: 34.88 KG/M2 | WEIGHT: 249.13 LBS

## 2018-04-23 DIAGNOSIS — M17.11 PRIMARY OSTEOARTHRITIS OF RIGHT KNEE: Primary | ICD-10-CM

## 2018-04-23 PROCEDURE — 99999 PR PBB SHADOW E&M-EST. PATIENT-LVL III: CPT | Mod: PBBFAC,,, | Performed by: PHYSICIAN ASSISTANT

## 2018-04-23 PROCEDURE — 99213 OFFICE O/P EST LOW 20 MIN: CPT | Mod: PBBFAC,25 | Performed by: PHYSICIAN ASSISTANT

## 2018-04-23 PROCEDURE — 99499 UNLISTED E&M SERVICE: CPT | Mod: S$PBB,,, | Performed by: PHYSICIAN ASSISTANT

## 2018-04-23 PROCEDURE — 20610 DRAIN/INJ JOINT/BURSA W/O US: CPT | Mod: S$PBB,RT,, | Performed by: PHYSICIAN ASSISTANT

## 2018-04-23 PROCEDURE — 20610 DRAIN/INJ JOINT/BURSA W/O US: CPT | Mod: PBBFAC | Performed by: PHYSICIAN ASSISTANT

## 2018-04-23 RX ADMIN — Medication 20 MG: at 01:04

## 2018-04-23 NOTE — LETTER
April 23, 2018      Major Rojas MD  [address]  [facility] [address]           [facility] - Orthopedics  [address]  [facility] [address]  Phone: [phone]          Patient: Derik Wolfe   MR Number: [id number]   YOB: 1935   Date of Visit: 4/23/2018       Dear Dr. Major Rojas:    Thank you for referring Derik Wolfe to me for evaluation. Attached you will find relevant portions of my assessment and plan of care.    If you have questions, please do not hesitate to call me. I look forward to following Derik Wolfe along with you.    Sincerely,    Irene Gaviria PA-C    Enclosure  CC:  No Recipients    If you would like to receive this communication electronically, please contact [email or web address] or [phone] to request more information on [employer] Link access.    For providers and/or their staff who would like to refer a patient to [facility], please contact us through our one-stop-shop provider referral line, [facility] , at [phone].    If you feel you have received this communication in error or would no longer like to receive these types of communications, please e-mail [email or web address]

## 2018-04-23 NOTE — PROGRESS NOTES
Derikmaria victoria Wolfe presents to clinic today for the second right knee Euflexxa injection.    Exam demonstrates the no effusion in the  right knee, and the skin is intact.    Diagnosis: osteoarthritis knee    After time out was performed and patient ID, side, and site were verified, the  right  knee was sterilly prepped in the standard fashion.  A 22-gauge needle was introduced into right knee joint from an lata-lateral site without complication and knee was then injected with 2 ml of Euflexxa.  Sterile dressing was applied.  The patient was instructed to resume activities as tolerated and to call with any problems.     We will see [place] SARAH Wolfe back next week for the third injection.

## 2018-05-04 ENCOUNTER — PATIENT MESSAGE (OUTPATIENT)
Dept: ORTHOPEDICS | Facility: CLINIC | Age: 83
End: 2018-05-04

## 2018-05-08 ENCOUNTER — OFFICE VISIT (OUTPATIENT)
Dept: ORTHOPEDICS | Facility: CLINIC | Age: 83
End: 2018-05-08
Payer: MEDICARE

## 2018-05-08 VITALS
BODY MASS INDEX: 33.73 KG/M2 | DIASTOLIC BLOOD PRESSURE: 84 MMHG | SYSTOLIC BLOOD PRESSURE: 160 MMHG | HEART RATE: 75 BPM | HEIGHT: 71 IN | WEIGHT: 240.94 LBS

## 2018-05-08 DIAGNOSIS — M17.11 PRIMARY OSTEOARTHRITIS OF RIGHT KNEE: Primary | ICD-10-CM

## 2018-05-08 PROCEDURE — 99213 OFFICE O/P EST LOW 20 MIN: CPT | Mod: PBBFAC,25 | Performed by: PHYSICIAN ASSISTANT

## 2018-05-08 PROCEDURE — 20610 DRAIN/INJ JOINT/BURSA W/O US: CPT | Mod: S$PBB,RT,, | Performed by: PHYSICIAN ASSISTANT

## 2018-05-08 PROCEDURE — 99999 PR PBB SHADOW E&M-EST. PATIENT-LVL III: CPT | Mod: PBBFAC,,, | Performed by: PHYSICIAN ASSISTANT

## 2018-05-08 PROCEDURE — 99499 UNLISTED E&M SERVICE: CPT | Mod: S$PBB,,, | Performed by: PHYSICIAN ASSISTANT

## 2018-05-08 PROCEDURE — 20610 DRAIN/INJ JOINT/BURSA W/O US: CPT | Mod: PBBFAC | Performed by: PHYSICIAN ASSISTANT

## 2018-05-08 RX ADMIN — Medication 20 MG: at 08:05

## 2018-05-08 NOTE — LETTER
May 8, 2018      Major Rojas MD  [address]  [place] [address]           [facility] - Orthopedics  [address]  [place] [address]  Phone: [phone]          Patient: Derik Wolfe   MR Number: [id number]   YOB: 1935   Date of Visit: 5/8/2018       Dear Dr. Major Rojas:    Thank you for referring Derik Wolfe to me for evaluation. Attached you will find relevant portions of my assessment and plan of care.    If you have questions, please do not hesitate to call me. I look forward to following Derik Wolfe along with you.    Sincerely,    Irene Gaviria PA-C    Enclosure  CC:  No Recipients    If you would like to receive this communication electronically, please contact [email or web address] or [phone] to request more information on [employer] Link access.    For providers and/or their staff who would like to refer a patient to [facility], please contact us through our one-stop-shop provider referral line, [facility] , at [phone].    If you feel you have received this communication in error or would no longer like to receive these types of communications, please e-mail [email or web address]

## 2018-05-08 NOTE — PROGRESS NOTES
Derik Wolfe presents to clinic today for the third right knee Euflexxa injection.    Exam demonstrates the no effusion in the  right knee, and the skin is intact.    Diagnosis: osteoarthritis knee    After time out was performed and patient ID, side, and site were verified, the  right  knee was sterilly prepped in the standard fashion.  A 22-gauge needle was introduced into right knee joint from an lata-lateral site without complication and knee was then injected with 2 ml of Euflexxa.  Sterile dressing was applied.  The patient was instructed to resume activities as tolerated and to call with any problems.     F/u prn.

## 2018-06-02 ENCOUNTER — PATIENT MESSAGE (OUTPATIENT)
Dept: INTERNAL MEDICINE | Facility: CLINIC | Age: 83
End: 2018-06-02

## 2018-06-04 ENCOUNTER — PATIENT MESSAGE (OUTPATIENT)
Dept: DERMATOLOGY | Facility: CLINIC | Age: 83
End: 2018-06-04

## 2018-06-04 ENCOUNTER — PATIENT MESSAGE (OUTPATIENT)
Dept: INTERNAL MEDICINE | Facility: CLINIC | Age: 83
End: 2018-06-04

## 2018-06-04 RX ORDER — ATENOLOL 25 MG/1
25 TABLET ORAL DAILY
Qty: 30 TABLET | Refills: 11 | Status: SHIPPED | OUTPATIENT
Start: 2018-06-04 | End: 2019-04-10 | Stop reason: SDUPTHER

## 2018-06-04 RX ORDER — ALLOPURINOL 300 MG/1
300 TABLET ORAL DAILY
Qty: 90 TABLET | Refills: 0 | Status: SHIPPED | OUTPATIENT
Start: 2018-06-04 | End: 2018-09-04 | Stop reason: SDUPTHER

## 2018-06-05 ENCOUNTER — TELEPHONE (OUTPATIENT)
Dept: DERMATOLOGY | Facility: CLINIC | Age: 83
End: 2018-06-05

## 2018-07-25 ENCOUNTER — OFFICE VISIT (OUTPATIENT)
Dept: DERMATOLOGY | Facility: CLINIC | Age: 83
End: 2018-07-25
Payer: MEDICARE

## 2018-07-25 DIAGNOSIS — L82.1 SK (SEBORRHEIC KERATOSIS): ICD-10-CM

## 2018-07-25 DIAGNOSIS — L57.0 AK (ACTINIC KERATOSIS): ICD-10-CM

## 2018-07-25 DIAGNOSIS — L81.4 LENTIGINES: ICD-10-CM

## 2018-07-25 DIAGNOSIS — D48.5 NEOPLASM OF UNCERTAIN BEHAVIOR OF SKIN: Primary | ICD-10-CM

## 2018-07-25 DIAGNOSIS — Z12.83 SKIN CANCER SCREENING: ICD-10-CM

## 2018-07-25 DIAGNOSIS — Z85.828 HISTORY OF NONMELANOMA SKIN CANCER: ICD-10-CM

## 2018-07-25 PROCEDURE — 88305 TISSUE EXAM BY PATHOLOGIST: CPT | Performed by: PATHOLOGY

## 2018-07-25 PROCEDURE — 11100 PR BIOPSY OF SKIN LESION: CPT | Mod: 59,S$PBB,, | Performed by: DERMATOLOGY

## 2018-07-25 PROCEDURE — 99999 PR PBB SHADOW E&M-EST. PATIENT-LVL III: CPT | Mod: PBBFAC,,, | Performed by: DERMATOLOGY

## 2018-07-25 PROCEDURE — 17004 DESTROY PREMAL LESIONS 15/>: CPT | Mod: PBBFAC | Performed by: DERMATOLOGY

## 2018-07-25 PROCEDURE — 11100 PR BIOPSY OF SKIN LESION: CPT | Mod: 59,PBBFAC | Performed by: DERMATOLOGY

## 2018-07-25 PROCEDURE — 99213 OFFICE O/P EST LOW 20 MIN: CPT | Mod: 25,S$PBB,, | Performed by: DERMATOLOGY

## 2018-07-25 PROCEDURE — 99213 OFFICE O/P EST LOW 20 MIN: CPT | Mod: PBBFAC,25 | Performed by: DERMATOLOGY

## 2018-07-25 PROCEDURE — 17004 DESTROY PREMAL LESIONS 15/>: CPT | Mod: S$PBB,,, | Performed by: DERMATOLOGY

## 2018-07-25 NOTE — PATIENT INSTRUCTIONS
Summer Sun Protection      The [facility] would like to remind you of the importance of sun protection all year round and particularly during the summer when the suns rays are the strongest. It has been proven that both acute and chronic sun exposure damages our cells and leads to skin cancer. Beyond skin cancer, the sun causes 90% of the symptoms of pre-mature skin aging, including wrinkles, lentigines (brown spots), and thin, easily bruised skin. Proper sun protection can help prevent these unwanted conditions.    Many patients report that the dont go in the sun. It has been shown that the average person receives 18 hours of incidental sun exposure per week during activities such as walking through parking lots, driving, or sitting next to windows. This accumulates to several bad sunburns per year!    In choosing sunscreen, you want one that protects against both UVA and UVB rays. It is recommended that you use one of SPF 30 or higher. It is important to apply the sunscreen about 20 minutes prior to sun exposure. Most sunscreens are chemical sunscreens and a reaction must take place in the skin so that they are effective. If they are applied and then you are immediately exposed to the sun or start sweating, this reaction has not had time to take place and you are therefore unprotected. Sunscreen needs to be reapplied every 2 hours if you are participating in water sports or sweating. We recommend Elta MD or Neutrogena Ultra Sheer Dry Touch SPF 55 for daily use; however there are many options and it is most important for you to find one that you will use on a consistent basis.    If you have sensitive skin, you may do best with a sunscreen that contains only physical blockers such as titanium dioxide or zinc oxide. These are typically thicker and harder to apply, however they afford very good protection. Neutrogena Sensitive Skin, Blue Lizard Sensitive Skin (pink top) or Neutrogena Pure  and Free are popular ones.     Aside from sunscreen, clothes with UV protection, wide brimmed hats, and sunglasses are other means of sun protection that we recommend.                        [facility] - DERMATOLOGY  [employer]  [place] [address]  Dept: [phone]  Dept Fax: [phone]                                                                                Shave Biopsy Wound Care    Your doctor has performed a shave biopsy today.  A band aid and vaseline ointment has been placed over the site.  This should remain in place for 24 hours.  It is recommended that you keep the area dry for the first 24 hours.  After 24 hours, you may remove the band aid and wash the area with warm soap and water and apply Vaseline jelly.  Many patients prefer to use Neosporin or Bacitracin ointment.  This is acceptable; however, know that you can develop an allergy to this medication even if you have used it safely for years.  It is important to keep the area moist.  Letting it dry out and get air slows healing time, and will worsen the scar.  Band aid is optional after first 24 hours.      If you notice increasing redness, tenderness, pain, or yellow drainage at the biopsy site, please notify your doctor.  These are signs of an infection.    If your biopsy site is bleeding, apply firm pressure for 15 minutes straight.  Repeat for another 15 minutes, if it is still bleeding.   If the surgical site continues to bleed, then please contact your doctor.      [address] [place] [address]/ [phone] FAX/ [email or web address]      CRYOSURGERY      Your doctor has used a method called cryosurgery to treat your skin condition. Cryosurgery refers to the use of very cold substances to treat a variety of skin conditions such as warts, pre-skin cancers, molluscum contagiosum, sun spots, and several benign growths. The substance we use in cryosurgery is liquid nitrogen  and is so cold (-195 degrees Celsius) that is burns when administered.     Following treatment in the office, the skin may immediately burn and become red. You may find the area around the lesion is affected as well. It is sometimes necessary to treat not only the lesion, but a small area of the surrounding normal skin to achieve a good response.     A blister, and even a blood filled blister, may form after treatment.   This is a normal response. If the blister is painful, it is acceptable to sterilize a needle and with rubbing alcohol and gently pop the blister. It is important that you gently wash the area with soap and warm water as the blister fluid may contain wart virus if a wart was treated. Do no remove the roof of the blister.     The area treated can take anywhere from 1-3 weeks to heal. Healing time depends on the kind skin lesion treated, the location, and how aggressively the lesion was treated. It is recommended that the areas treated are covered with Vaseline or bacitracin ointment and a band-aid. If a band-aid is not practical, just ointment applied several times per day will do. Keeping these areas moist will speed the healing time.    Treatment with liquid nitrogen can leave a scar. In dark skin, it may be a light or dark scar, in light skin it may be a white or pink scar. These will generally fade with time, but may never go away completely.     If you have any concerns after your treatment, please feel free to call the office.       [address] [place] [address]/ [phone] FAX/ [email or web address]    '

## 2018-07-25 NOTE — PROGRESS NOTES
Subjective:       Patient ID:  Derik Wolfe is a 82 y.o. male who presents for   Chief Complaint   Patient presents with    Skin Check    Lesion     Pt here today for a UBSE. Pt c/o tender lesion on back x 6 weeks. No prev tx.    Patient has a history of non-melanoma skin cancer and is here today for routine skin check.      Has used efudex in the past on arms and forehead.  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    Review of Systems   Skin: Positive for activity-related sunscreen use. Negative for daily sunscreen use, tendency to form keloidal scars and recent sunburn.   Hematologic/Lymphatic: Does not bruise/bleed easily.        Objective:    Physical Exam   Constitutional: He appears well-developed and well-nourished. No distress.   Neurological: He is alert and oriented to person, place, and time. He is not disoriented.   Psychiatric: He has a normal mood and affect.   Skin:   Areas Examined (abnormalities noted in diagram):   Scalp / Hair Palpated and Inspected  Head / Face Inspection Performed  Neck Inspection Performed  Chest / Axilla Inspection Performed  Abdomen Inspection Performed  Back Inspection Performed  RUE Inspected  LUE Inspection Performed  Nails and Digits Inspection Performed                           Diagram Legend     Erythematous scaling macule/papule c/w actinic keratosis       Vascular papule c/w angioma      Pigmented verrucoid papule/plaque c/w seborrheic keratosis      Yellow umbilicated papule c/w sebaceous hyperplasia      Irregularly shaped tan macule c/w lentigo     1-2 mm smooth white papules consistent with Milia      Movable subcutaneous cyst with punctum c/w epidermal inclusion cyst      Subcutaneous movable cyst c/w pilar cyst      Firm pink to brown papule c/w dermatofibroma      Pedunculated fleshy papule(s) c/w skin tag(s)      Evenly pigmented macule c/w junctional nevus     Mildly variegated pigmented, slightly  irregular-bordered macule c/w mildly atypical nevus      Flesh colored to evenly pigmented papule c/w intradermal nevus       Pink pearly papule/plaque c/w basal cell carcinoma      Erythematous hyperkeratotic cursted plaque c/w SCC      Surgical scar with no sign of skin cancer recurrence      Open and closed comedones      Inflammatory papules and pustules      Verrucoid papule consistent consistent with wart     Erythematous eczematous patches and plaques     Dystrophic onycholytic nail with subungual debris c/w onychomycosis     Umbilicated papule    Erythematous-base heme-crusted tan verrucoid plaque consistent with inflamed seborrheic keratosis     Erythematous Silvery Scaling Plaque c/w Psoriasis     See annotation      Assessment / Plan:      Neoplasm of uncertain behavior of skin  Shave biopsy procedure note:    Shave biopsy performed after verbal consent including risk of infection, scar, recurrence, need for additional treatment of site. Area prepped with alcohol, anesthetized with approximately 1.0cc of 1% lidocaine with epinephrine. Lesional tissue shaved with razor blade. Hemostasis achieved with application of aluminum chloride followed by hyfrecation. No complications. Dressing applied. Wound care explained.  -     Tissue Specimen To Pathology, Dermatology  Pathology Orders:     Normal Orders This Visit    Tissue Specimen To Pathology, Dermatology     Questions:    Directional Terms:  Other(comment)    Clinical information:  r/o SCC vs. HAK vs. other Comment - shave    Specific Site:  R upper back          AK (actinic keratosis)  Cryosurgery Procedure Note    Verbal consent from the patient is obtained including, but not limited to, risk of hypopigmentation/hyperpigmentation, scar, recurrence of lesion. The patient is aware of the precancerous quality and need for treatment of these lesions. Liquid nitrogen cryosurgery is applied to the 15 actinic keratoses, as detailed in the physical exam, to  produce a freeze injury. The patient is aware that blisters may form and is instructed on wound care with gentle cleansing and use of vaseline ointment to keep moist until healed. The patient is supplied a handout on cryosurgery and is instructed to call if lesions do not completely resolve.    SK (seborrheic keratosis)  These are benign inherited growths without a malignant potential. Reassurance given to patient. No treatment is necessary.   Treatment of benign, asymptomatic lesions may be considered cosmetic.  Warned about risk of hypo- or hyperpigmentation with treatment and risk of recurrence.    Lentigines  These are benign sun spots which should be monitored for changes. Patient instructed in importance of daily broad spectrum sunscreen use with spf at least 30. Sun avoidance and topical protection/protective clothing discussed.    Skin cancer screening and History of nonmelanoma skin cancer  Upper body skin examination performed today including at least 6 points as noted in physical examination. Suspicious lesions noted above.  Patient instructed in importance of daily broad spectrum sunscreen use with spf at least 30. Sun avoidance and topical protection/protective clothing discussed.      Follow-up in about 6 months (around 1/25/2019) for skin check or sooner pending biopsy results.

## 2018-08-10 ENCOUNTER — PROCEDURE VISIT (OUTPATIENT)
Dept: DERMATOLOGY | Facility: CLINIC | Age: 83
End: 2018-08-10
Payer: MEDICARE

## 2018-08-10 DIAGNOSIS — C44.529 SQUAMOUS CELL CARCINOMA OF BACK: Primary | ICD-10-CM

## 2018-08-10 PROCEDURE — 99499 UNLISTED E&M SERVICE: CPT | Mod: S$PBB,,, | Performed by: DERMATOLOGY

## 2018-08-10 PROCEDURE — 88305 TISSUE EXAM BY PATHOLOGIST: CPT | Mod: 26,,, | Performed by: PATHOLOGY

## 2018-08-10 PROCEDURE — 11602 EXC TR-EXT MAL+MARG 1.1-2 CM: CPT | Mod: 51,S$PBB,, | Performed by: DERMATOLOGY

## 2018-08-10 PROCEDURE — 88305 TISSUE EXAM BY PATHOLOGIST: CPT | Performed by: PATHOLOGY

## 2018-08-10 PROCEDURE — 12032 INTMD RPR S/A/T/EXT 2.6-7.5: CPT | Mod: PBBFAC | Performed by: DERMATOLOGY

## 2018-08-10 PROCEDURE — 11602 EXC TR-EXT MAL+MARG 1.1-2 CM: CPT | Mod: PBBFAC | Performed by: DERMATOLOGY

## 2018-08-10 PROCEDURE — 12032 INTMD RPR S/A/T/EXT 2.6-7.5: CPT | Mod: S$PBB,,, | Performed by: DERMATOLOGY

## 2018-08-10 NOTE — PATIENT INSTRUCTIONS
Post- Operative Wound Care    Your doctor has performed local skin surgery today.  Vaseline ointment and a pressure bandage were placed after the surgery.  It is very important that you keep this bandage in place for 24 hours.  This will decrease the risk of post - operative infection and bleeding.  After 24 hours, you may remove the band aid and wash the area with warm soap and water and apply Vaseline ointment.  Many patients prefer to use Neosporin or Bacitracin ointment.  This is acceptable; however know that you can develop an allergy to this medication even if you have used it safely for years.  It is important to keep the area moist.  Letting it dry out and get air slows healing time, will worsen the scar, and make it more difficult to remove the stitches.  Band aid is optional after first 24 hours.        If you notice increasing redness, tenderness, pain, or yellow drainage at the biopsy or surgical site, please notify your doctor.  These are signs of an infection.    If your biopsy/surgical site is bleeding, apply firm pressure for 15 minutes straight.  Repeat for another 15 minutes, if it is still bleeding.   If the surgical site continues to bleed, then please contact your doctor.      [address] [facility] [address]/ [phone] FAX/ [email or web address]

## 2018-08-10 NOTE — PROGRESS NOTES
PROCEDURE: Elliptical excision with intermediate layered repair in order to decrease dead space and decrease tension.    ANESTHETIC: 6 cc 1% Xylocaine with Epinephrine 1:100,000, buffered    SURGEON: Fernanda Hager M.D.    ASSISTANTS: Ana Ochoa MA    PREOPERATIVE DIAGNOSIS:  Biopsy-proven Squamous Cell Carcinoma    POSTOPERATIVE DIAGNOSIS:  Same as preoperative diagnosis    PATHOLOGIC DIAGNOSIS: Pending    LOCATION: R upper back    INITIAL LESION SIZE: 1.2x1.1 cm    EXCISED DIAMETER: 2 cm    PREPARATION: The diagnosis, procedure, alternatives, benefits and risks, including but not limited to: infection, bleeding/bruising, drug reactions, pain, scar or cosmetic defect, local sensation disturbances, wound dehiscence (separation of wound edges after sutures removed) and/or recurrence of present condition were explained to the patient. The patient elected to proceed.  Patient's identity was verified using 2 patient identifiers and the side and site was verified.  Time out period with surgeon, assistant and patient in surgical suite was taken.    PROCEDURE: The location noted above was prepped, draped, and anesthetized in the usual sterile fashion per Fernanda Hager. Lesional tissue was carefully marked with at least 4 mm margins of clinically normal skin in all directions. A fusiform elliptical excision was done with #15 blade carried down completely through the dermis into the deep subcutaneous tissues to the level of the non-muscle fascia, and dissection was carried out in that plane.  Electrocoagulation was used to obtain hemostasis. Blood loss was minimal. The wound was then approximated in a layered fashion with subcutaneous and intradermal sutures of 4.0 Monocryl, approximately 4 in number, and the wound was then superficially closed with simple interrupted sutures of 4.0 Prolene.    The patient tolerated the procedure well.    The area was cleaned and dressed appropriately and the patient was given wound  care instructions, as well as an appointment for follow-up evaluation.    LENGTH OF REPAIR: 4.8 cm    rtc 2 wks for suture removal

## 2018-08-24 ENCOUNTER — CLINICAL SUPPORT (OUTPATIENT)
Dept: DERMATOLOGY | Facility: CLINIC | Age: 83
End: 2018-08-24
Payer: MEDICARE

## 2018-08-24 DIAGNOSIS — Z48.02 ENCOUNTER FOR REMOVAL OF SUTURES: Primary | ICD-10-CM

## 2018-08-24 PROCEDURE — 99999 PR PBB SHADOW E&M-EST. PATIENT-LVL II: CPT | Mod: PBBFAC,,,

## 2018-08-24 PROCEDURE — 99212 OFFICE O/P EST SF 10 MIN: CPT | Mod: PBBFAC

## 2018-08-24 NOTE — PROGRESS NOTES
Suture Removal note:  CC: 83 y.o. male patient is here for suture removal.         HPI: Patient is s/p excision of a Squamous Cell Carcinoma from the right upper back on 8/10/2018.  Patient reports no problems.    WOUND PE:  Sutures intact.  Wound healing well.  Good approximation of skin edges.  No signs or symptoms of infection.    IMPRESSION:    FINAL PATHOLOGIC DIAGNOSIS  Skin, right upper back, re-excision:  -SCAR (POST-SURGICAL)  -NO RESIDUAL SQUAMOUS CELL CARCINOMA IDENTIFIED    PLAN:  Sutures removed today.  Continue wound care.    RTC: In 6 months.    **One AK on bridge of nose was treated today with LN2. Will biopsy at next visit if recurs (previously treated in the past with efudex)

## 2018-09-04 RX ORDER — ALLOPURINOL 300 MG/1
300 TABLET ORAL DAILY
Qty: 90 TABLET | Refills: 3 | Status: SHIPPED | OUTPATIENT
Start: 2018-09-04 | End: 2019-12-06 | Stop reason: SDUPTHER

## 2018-11-20 ENCOUNTER — TELEPHONE (OUTPATIENT)
Dept: INTERNAL MEDICINE | Facility: CLINIC | Age: 83
End: 2018-11-20

## 2019-04-10 ENCOUNTER — OFFICE VISIT (OUTPATIENT)
Dept: INTERNAL MEDICINE | Facility: CLINIC | Age: 84
End: 2019-04-10
Payer: MEDICARE

## 2019-04-10 ENCOUNTER — HOSPITAL ENCOUNTER (OUTPATIENT)
Dept: RADIOLOGY | Facility: HOSPITAL | Age: 84
Discharge: HOME | End: 2019-04-10
Attending: INTERNAL MEDICINE
Payer: MEDICARE

## 2019-04-10 VITALS
HEIGHT: 71 IN | WEIGHT: 255.75 LBS | HEART RATE: 85 BPM | OXYGEN SATURATION: 98 % | DIASTOLIC BLOOD PRESSURE: 78 MMHG | SYSTOLIC BLOOD PRESSURE: 132 MMHG | BODY MASS INDEX: 35.81 KG/M2

## 2019-04-10 DIAGNOSIS — N20.0 RENAL STONES: ICD-10-CM

## 2019-04-10 DIAGNOSIS — R06.02 SOB (SHORTNESS OF BREATH): ICD-10-CM

## 2019-04-10 DIAGNOSIS — R05.9 COUGH: ICD-10-CM

## 2019-04-10 DIAGNOSIS — R60.9 EDEMA, UNSPECIFIED TYPE: ICD-10-CM

## 2019-04-10 DIAGNOSIS — Z87.891 FORMER SMOKER: ICD-10-CM

## 2019-04-10 DIAGNOSIS — R73.09 ELEVATED GLUCOSE: ICD-10-CM

## 2019-04-10 DIAGNOSIS — I10 ESSENTIAL HYPERTENSION: Primary | ICD-10-CM

## 2019-04-10 DIAGNOSIS — I10 ESSENTIAL HYPERTENSION: ICD-10-CM

## 2019-04-10 DIAGNOSIS — Z87.39 HISTORY OF GOUT: ICD-10-CM

## 2019-04-10 PROCEDURE — 99215 PR OFFICE/OUTPT VISIT, EST, LEVL V, 40-54 MIN: ICD-10-PCS | Mod: S$PBB,,, | Performed by: INTERNAL MEDICINE

## 2019-04-10 PROCEDURE — 99214 OFFICE O/P EST MOD 30 MIN: CPT | Mod: PBBFAC,25 | Performed by: INTERNAL MEDICINE

## 2019-04-10 PROCEDURE — 71046 XR CHEST PA AND LATERAL: ICD-10-PCS | Mod: 26,,, | Performed by: RADIOLOGY

## 2019-04-10 PROCEDURE — 71046 X-RAY EXAM CHEST 2 VIEWS: CPT | Mod: 26,,, | Performed by: RADIOLOGY

## 2019-04-10 PROCEDURE — 99215 OFFICE O/P EST HI 40 MIN: CPT | Mod: S$PBB,,, | Performed by: INTERNAL MEDICINE

## 2019-04-10 PROCEDURE — 99999 PR PBB SHADOW E&M-EST. PATIENT-LVL IV: CPT | Mod: PBBFAC,,, | Performed by: INTERNAL MEDICINE

## 2019-04-10 PROCEDURE — 99999 PR PBB SHADOW E&M-EST. PATIENT-LVL IV: ICD-10-PCS | Mod: PBBFAC,,, | Performed by: INTERNAL MEDICINE

## 2019-04-10 PROCEDURE — 71046 X-RAY EXAM CHEST 2 VIEWS: CPT | Mod: TC

## 2019-04-10 RX ORDER — ATENOLOL 25 MG/1
25 TABLET ORAL DAILY
Qty: 90 TABLET | Refills: 11 | Status: SHIPPED | OUTPATIENT
Start: 2019-04-10 | End: 2020-07-09 | Stop reason: SDUPTHER

## 2019-04-10 NOTE — PROGRESS NOTES
Subjective:       Patient ID: Derik Wolfe is a 83 y.o. male.    Chief Complaint: No chief complaint on file.    HPI patient longstanding history being overweight renal uric kidney and remote smoking in for follow-up of several problems.  He has gained weight, not labs in a few years but is retaining fluid in his lower extremities noticed that he short of either with cutting the grass or sometimes talking in long sentences he thought it was related to weight gain or getting older or out of shape.  He says he can still cut his grass but it takes a little while, he takes it slow.  He has some right knee arthritis and wears a brace.  He sees Dermatology for skin follow-ups and has had a history of skin cancer no major new problems. We reviewed some old x-rays that showed some old changes probably from smoking but he quit 30 years ago.  He does occasionally cough and has mucus other than the knee brace and shots to the knee has he has not tried anything for these problems.  He has not tried to intentionally lose weight or reduce his salt.  No productive or bloody sputum.  No blood in the urine or stool.  No gout attacks since on allopurinol.  He does need some prescription refills.  We talked about numerous things that can cause lower extremity swelling and discussed a low-salt diet.  He reminded me that in 2011 he went to the ER for edema and was briefly on furosemide but that subsequently stopped and this had not been a problem in a while.    Review of Systems   Constitutional: Positive for unexpected weight change. Negative for activity change, chills, fatigue and fever.   HENT: Negative for ear pain and sore throat.    Eyes: Negative for pain, discharge and visual disturbance.   Respiratory: Positive for cough and shortness of breath. Negative for wheezing.    Cardiovascular: Positive for leg swelling. Negative for chest pain and palpitations.   Gastrointestinal: Negative for abdominal pain, constipation,  diarrhea, nausea and vomiting.   Genitourinary: Negative for difficulty urinating, frequency and hematuria.   Musculoskeletal: Positive for arthralgias. Negative for back pain, myalgias, neck pain and neck stiffness.   Skin: Negative for rash and wound.   Neurological: Negative for dizziness, numbness and headaches.   Hematological: Negative for adenopathy.   Psychiatric/Behavioral: Negative for dysphoric mood. The patient is not nervous/anxious.        Objective:      Physical Exam   Constitutional: He is oriented to person, place, and time. He appears well-developed and well-nourished. No distress.   HENT:   Head: Normocephalic and atraumatic.   Right Ear: External ear normal.   Left Ear: External ear normal.   Mouth/Throat: Oropharynx is clear and moist. No oropharyngeal exudate.   TM's clear, pharynx clear   Eyes: Pupils are equal, round, and reactive to light. Conjunctivae and EOM are normal. No scleral icterus.   Neck: Normal range of motion. Neck supple. No thyromegaly present.   No supraclavicular nodes palpated   Cardiovascular: Normal rate, regular rhythm and normal heart sounds.   No murmur heard.  Pulmonary/Chest: Effort normal and breath sounds normal. No stridor. No respiratory distress. He has no wheezes. He has no rales.   Coughing occasionally but no wheezes   Abdominal: Soft. Bowel sounds are normal. He exhibits no mass. There is no tenderness.   Musculoskeletal: He exhibits edema (2+ lower extremity edema) and tenderness ( right knee, mildly tender but stable with a brace).   Lymphadenopathy:     He has no cervical adenopathy.   Neurological: He is alert and oriented to person, place, and time.   Skin: No rash noted. No erythema. No pallor.   Numerous raised crusted skin lesions-he is due for a dermatology follow-up.   Psychiatric: He has a normal mood and affect. His behavior is normal.       Assessment:       1. Essential hypertension    2. Renal stones    3. Elevated glucose    4. Former smoker     5. Cough    6. SOB (shortness of breath)    7. History of gout    8. Edema, unspecified type        Plan:       Derik was seen today for follow-up, shortness of breath and edema.    Diagnoses and all orders for this visit:    Essential hypertension  -     Lipid panel; Future  -     Comprehensive metabolic panel; Future  -     Hemoglobin A1c; Future  -     X-Ray Chest PA And Lateral; Future  -     CBC auto differential; Future  -     Uric acid; Future  -     Brain natriuretic peptide; Future  -     TSH; Future    Renal stones  -     Comprehensive metabolic panel; Future  -     CBC auto differential; Future  -     Uric acid; Future  -     TSH; Future    Elevated glucose  -     Lipid panel; Future  -     Hemoglobin A1c; Future  -     CBC auto differential; Future  -     Uric acid; Future  -     TSH; Future    Former smoker  -     X-Ray Chest PA And Lateral; Future  -     CBC auto differential; Future  -     Uric acid; Future  -     TSH; Future    Cough  -     X-Ray Chest PA And Lateral; Future  -     CBC auto differential; Future  -     Uric acid; Future  -     TSH; Future    SOB (shortness of breath)  Comments:  Pt finds he gets tired and a little winded cutting grass. Less stamina. Gets out of breath if talking in long sentences.   Orders:  -     X-Ray Chest PA And Lateral; Future  -     CBC auto differential; Future  -     Uric acid; Future  -     TSH; Future    History of gout  -     CBC auto differential; Future  -     Uric acid; Future  -     TSH; Future    Edema, unspecified type  -     Brain natriuretic peptide; Future  -     TSH; Future    Other orders  -     atenolol (TENORMIN) 25 MG tablet; Take 1 tablet (25 mg total) by mouth once daily.        low-salt diet information sheet given review all studies.  Work on weight reduction, monitor symptoms.  May need a short follow-up interval.

## 2019-05-01 ENCOUNTER — OFFICE VISIT (OUTPATIENT)
Dept: DERMATOLOGY | Facility: CLINIC | Age: 84
End: 2019-05-01
Payer: MEDICARE

## 2019-05-01 DIAGNOSIS — L82.1 SK (SEBORRHEIC KERATOSIS): ICD-10-CM

## 2019-05-01 DIAGNOSIS — L57.0 AK (ACTINIC KERATOSIS): ICD-10-CM

## 2019-05-01 DIAGNOSIS — Z12.83 SKIN CANCER SCREENING: ICD-10-CM

## 2019-05-01 DIAGNOSIS — L57.0 MULTIPLE ACTINIC KERATOSES: Primary | ICD-10-CM

## 2019-05-01 PROCEDURE — 99213 OFFICE O/P EST LOW 20 MIN: CPT | Mod: 25,S$PBB,, | Performed by: DERMATOLOGY

## 2019-05-01 PROCEDURE — 17003 DESTRUCT PREMALG LES 2-14: CPT | Mod: PBBFAC | Performed by: DERMATOLOGY

## 2019-05-01 PROCEDURE — 17000 PR DESTRUCTION(LASER SURGERY,CRYOSURGERY,CHEMOSURGERY),PREMALIGNANT LESIONS,FIRST LESION: ICD-10-PCS | Mod: S$PBB,,, | Performed by: DERMATOLOGY

## 2019-05-01 PROCEDURE — 99212 OFFICE O/P EST SF 10 MIN: CPT | Mod: PBBFAC,25 | Performed by: DERMATOLOGY

## 2019-05-01 PROCEDURE — 17000 DESTRUCT PREMALG LESION: CPT | Mod: S$PBB,,, | Performed by: DERMATOLOGY

## 2019-05-01 PROCEDURE — 17000 DESTRUCT PREMALG LESION: CPT | Mod: PBBFAC | Performed by: DERMATOLOGY

## 2019-05-01 PROCEDURE — 17003 DESTRUCT PREMALG LES 2-14: CPT | Mod: S$PBB,,, | Performed by: DERMATOLOGY

## 2019-05-01 PROCEDURE — 99999 PR PBB SHADOW E&M-EST. PATIENT-LVL II: CPT | Mod: PBBFAC,,, | Performed by: DERMATOLOGY

## 2019-05-01 PROCEDURE — 17003 DESTRUCTION, PREMALIGNANT LESIONS; SECOND THROUGH 14 LESIONS: ICD-10-PCS | Mod: S$PBB,,, | Performed by: DERMATOLOGY

## 2019-05-01 PROCEDURE — 99999 PR PBB SHADOW E&M-EST. PATIENT-LVL II: ICD-10-PCS | Mod: PBBFAC,,, | Performed by: DERMATOLOGY

## 2019-05-01 PROCEDURE — 99213 PR OFFICE/OUTPT VISIT, EST, LEVL III, 20-29 MIN: ICD-10-PCS | Mod: 25,S$PBB,, | Performed by: DERMATOLOGY

## 2019-05-01 RX ORDER — FLUOROURACIL 50 MG/G
CREAM TOPICAL
Qty: 40 G | Refills: 1 | Status: SHIPPED | OUTPATIENT
Start: 2019-05-01 | End: 2021-03-31

## 2019-05-01 RX ORDER — TRIAMCINOLONE ACETONIDE 0.25 MG/G
OINTMENT TOPICAL 2 TIMES DAILY
Qty: 80 G | Refills: 0 | Status: SHIPPED | OUTPATIENT
Start: 2019-05-01 | End: 2022-01-07

## 2019-05-01 NOTE — PATIENT INSTRUCTIONS
CRYOSURGERY      Your doctor has used a method called cryosurgery to treat your skin condition. Cryosurgery refers to the use of very cold substances to treat a variety of skin conditions such as warts, pre-skin cancers, molluscum contagiosum, sun spots, and several benign growths. The substance we use in cryosurgery is liquid nitrogen and is so cold (-195 degrees Celsius) that is burns when administered.     Following treatment in the office, the skin may immediately burn and become red. You may find the area around the lesion is affected as well. It is sometimes necessary to treat not only the lesion, but a small area of the surrounding normal skin to achieve a good response.     A blister, and even a blood filled blister, may form after treatment.   This is a normal response. If the blister is painful, it is acceptable to sterilize a needle and with rubbing alcohol and gently pop the blister. It is important that you gently wash the area with soap and warm water as the blister fluid may contain wart virus if a wart was treated. Do no remove the roof of the blister.     The area treated can take anywhere from 1-3 weeks to heal. Healing time depends on the kind skin lesion treated, the location, and how aggressively the lesion was treated. It is recommended that the areas treated are covered with Vaseline or bacitracin ointment and a band-aid. If a band-aid is not practical, just ointment applied several times per day will do. Keeping these areas moist will speed the healing time.    Treatment with liquid nitrogen can leave a scar. In dark skin, it may be a light or dark scar, in light skin it may be a white or pink scar. These will generally fade with time, but may never go away completely.     If you have any concerns after your treatment, please feel free to call the office.       [address] [place] [address]/ [phone] FAX/ [email or web address]    Summer Sun Protection      The  [facility] would like to remind you of the importance of sun protection all year round and particularly during the summer when the suns rays are the strongest. It has been proven that both acute and chronic sun exposure damages our cells and leads to skin cancer. Beyond skin cancer, the sun causes 90% of the symptoms of pre-mature skin aging, including wrinkles, lentigines (brown spots), and thin, easily bruised skin. Proper sun protection can help prevent these unwanted conditions.    Many patients report that the dont go in the sun. It has been shown that the average person receives 18 hours of incidental sun exposure per week during activities such as walking through parking lots, driving, or sitting next to windows. This accumulates to several bad sunburns per year!    In choosing sunscreen, you want one that protects against both UVA and UVB rays. It is recommended that you use one of SPF 30 or higher. It is important to apply the sunscreen about 20 minutes prior to sun exposure. Most sunscreens are chemical sunscreens and a reaction must take place in the skin so that they are effective. If they are applied and then you are immediately exposed to the sun or start sweating, this reaction has not had time to take place and you are therefore unprotected. Sunscreen needs to be reapplied every 2 hours if you are participating in water sports or sweating. We recommend Elta MD or Neutrogena Ultra Sheer Dry Touch SPF 55 for daily use; however there are many options and it is most important for you to find one that you will use on a consistent basis.    If you have sensitive skin, you may do best with a sunscreen that contains only physical blockers such as titanium dioxide or zinc oxide. These are typically thicker and harder to apply, however they afford very good protection. Neutrogena Sensitive Skin, Blue Lizard Sensitive Skin (pink top) or Neutrogena Pure and Free are popular ones.      Aside from sunscreen, clothes with UV protection, wide brimmed hats, and sunglasses are other means of sun protection that we recommend.                        [facility]  [address]  [facility] [address]  Dept: [phone]  Dept Fax: [phone]

## 2019-05-01 NOTE — PROGRESS NOTES
Subjective:       Patient ID:  Derik Wolfe is a 83 y.o. male who presents for   Chief Complaint   Patient presents with    Skin Check     ubse      HPI  Patient has a history of non-melanoma skin cancer and is here today for routine skin check.   Has a bump on L side of face pointed out by PCP. Unsure of duration. Asymptomatic and no prior treatment.    Has used efudex in the past on arms and forehead.    Recent skin history:  SCC excised from R upper back in August 2018.  SCC excised from R upper nose by Dr. Jones in 6/2014.    Review of Systems   Constitutional: Negative for fever, chills, weight loss, weight gain, fatigue, night sweats and malaise.   Skin: Positive for activity-related sunscreen use. Negative for daily sunscreen use and recent sunburn.   Hematologic/Lymphatic: Does not bruise/bleed easily.        Objective:    Physical Exam   Constitutional: He appears well-developed and well-nourished. No distress.   Neurological: He is alert and oriented to person, place, and time. He is not disoriented.   Psychiatric: He has a normal mood and affect.   Skin:   Areas Examined (abnormalities noted in diagram):   Head / Face Inspection Performed  Neck Inspection Performed  Chest / Axilla Inspection Performed  Abdomen Inspection Performed  Back Inspection Performed  RUE Inspected  LUE Inspection Performed  RLE Inspected  LLE Inspection Performed                   Diagram Legend     Erythematous scaling macule/papule c/w actinic keratosis       Vascular papule c/w angioma      Pigmented verrucoid papule/plaque c/w seborrheic keratosis      Yellow umbilicated papule c/w sebaceous hyperplasia      Irregularly shaped tan macule c/w lentigo     1-2 mm smooth white papules consistent with Milia      Movable subcutaneous cyst with punctum c/w epidermal inclusion cyst      Subcutaneous movable cyst c/w pilar cyst      Firm pink to brown papule c/w dermatofibroma      Pedunculated fleshy papule(s) c/w skin  tag(s)      Evenly pigmented macule c/w junctional nevus     Mildly variegated pigmented, slightly irregular-bordered macule c/w mildly atypical nevus      Flesh colored to evenly pigmented papule c/w intradermal nevus       Pink pearly papule/plaque c/w basal cell carcinoma      Erythematous hyperkeratotic cursted plaque c/w SCC      Surgical scar with no sign of skin cancer recurrence      Open and closed comedones      Inflammatory papules and pustules      Verrucoid papule consistent consistent with wart     Erythematous eczematous patches and plaques     Dystrophic onycholytic nail with subungual debris c/w onychomycosis     Umbilicated papule    Erythematous-base heme-crusted tan verrucoid plaque consistent with inflamed seborrheic keratosis     Erythematous Silvery Scaling Plaque c/w Psoriasis     See annotation      Assessment / Plan:        Multiple actinic keratoses  -     fluorouracil (EFUDEX) 5 % cream; AAA on face BID x 2-3 weeks. Stop if blistered, oozing, or bleeding. Use daily sun protection.  Dispense: 40 g; Refill: 1  Counseled extensively regarding the proper use and side effects of efudex. Pt will discontinue use if areas begin to ulcerate, bleed, blister, etc.  -     triamcinolone acetonide 0.025% (KENALOG) 0.025 % Oint; Apply topically 2 (two) times daily. X 1-2 wks To affected areas on face prn itching after using fluorouracil.  Dispense: 80 g; Refill: 0    AK (actinic keratosis)  Cryosurgery Procedure Note    Verbal consent from the patient is obtained including, but not limited to, risk of hypopigmentation/hyperpigmentation, scar, recurrence of lesion. The patient is aware of the precancerous quality and need for treatment of these lesions. Liquid nitrogen cryosurgery is applied to the 13 actinic keratoses, as detailed in the physical exam, to produce a freeze injury. The patient is aware that blisters may form and is instructed on wound care with gentle cleansing and use of vaseline  ointment to keep moist until healed. The patient is supplied a handout on cryosurgery and is instructed to call if lesions do not completely resolve.    SK (seborrheic keratosis)  These are benign inherited growths without a malignant potential. Reassurance given to patient. No treatment is necessary.   Treatment of benign, asymptomatic lesions may be considered cosmetic.  Warned about risk of hypo- or hyperpigmentation with treatment and risk of recurrence.    Skin cancer screening  Upper body skin examination performed today including at least 6 points as noted in physical examination. No lesions suspicious for malignancy noted.  Patient instructed in importance of daily broad spectrum sunscreen use with spf at least 30. Sun avoidance and topical protection/protective clothing discussed.      Follow up in about 3 months (around 8/1/2019).

## 2019-05-09 ENCOUNTER — OFFICE VISIT (OUTPATIENT)
Dept: INTERNAL MEDICINE | Facility: CLINIC | Age: 84
End: 2019-05-09
Payer: MEDICARE

## 2019-05-09 VITALS
OXYGEN SATURATION: 93 % | HEART RATE: 93 BPM | TEMPERATURE: 98 F | WEIGHT: 254.44 LBS | DIASTOLIC BLOOD PRESSURE: 70 MMHG | SYSTOLIC BLOOD PRESSURE: 129 MMHG | BODY MASS INDEX: 35.48 KG/M2

## 2019-05-09 DIAGNOSIS — J20.8 ACUTE BACTERIAL BRONCHITIS: Primary | ICD-10-CM

## 2019-05-09 DIAGNOSIS — B96.89 ACUTE BACTERIAL BRONCHITIS: Primary | ICD-10-CM

## 2019-05-09 DIAGNOSIS — I10 ESSENTIAL HYPERTENSION: ICD-10-CM

## 2019-05-09 PROCEDURE — 99213 PR OFFICE/OUTPT VISIT, EST, LEVL III, 20-29 MIN: ICD-10-PCS | Mod: S$PBB,,, | Performed by: INTERNAL MEDICINE

## 2019-05-09 PROCEDURE — 99213 OFFICE O/P EST LOW 20 MIN: CPT | Mod: PBBFAC | Performed by: INTERNAL MEDICINE

## 2019-05-09 PROCEDURE — 99999 PR PBB SHADOW E&M-EST. PATIENT-LVL III: CPT | Mod: PBBFAC,,, | Performed by: INTERNAL MEDICINE

## 2019-05-09 PROCEDURE — 99999 PR PBB SHADOW E&M-EST. PATIENT-LVL III: ICD-10-PCS | Mod: PBBFAC,,, | Performed by: INTERNAL MEDICINE

## 2019-05-09 PROCEDURE — 99213 OFFICE O/P EST LOW 20 MIN: CPT | Mod: S$PBB,,, | Performed by: INTERNAL MEDICINE

## 2019-05-09 RX ORDER — PREDNISONE 20 MG/1
20 TABLET ORAL SEE ADMIN INSTRUCTIONS
Qty: 12 TABLET | Refills: 0 | Status: SHIPPED | OUTPATIENT
Start: 2019-05-09 | End: 2019-05-15

## 2019-05-09 RX ORDER — DOXYCYCLINE 100 MG/1
100 CAPSULE ORAL 2 TIMES DAILY
Qty: 14 CAPSULE | Refills: 0 | Status: SHIPPED | OUTPATIENT
Start: 2019-05-09 | End: 2019-05-16

## 2019-05-09 NOTE — PROGRESS NOTES
Subjective:       Patient ID: Derik Wolfe is a 83 y.o. male.    Chief Complaint: Cough and Nasal Congestion    HPI: Pt c/o of 2 weeks of cough, mucus, and even some wheezing. We saw pt a few weeks ago. CXR was clear. Another family member was sick but resolved.   Patient denied any chest pain. No fever.  He does not feel too bad but worried things were getting worse rather than better.  He has been trying Mucinex and Tylenol.  Patient is a former smoker but quit well over 30 years ago    Review of Systems   Constitutional: Negative for chills, fatigue, fever and unexpected weight change.   HENT: Positive for congestion. Negative for nosebleeds and trouble swallowing.    Eyes: Negative for pain and visual disturbance.   Respiratory: Positive for cough and wheezing. Negative for shortness of breath.    Cardiovascular: Negative for chest pain and palpitations.   Gastrointestinal: Negative for abdominal pain, constipation, diarrhea, nausea and vomiting.   Genitourinary: Negative for difficulty urinating and hematuria.   Musculoskeletal: Negative for neck pain.   Skin: Negative for rash.   Neurological: Negative for dizziness and headaches.   Hematological: Does not bruise/bleed easily.   Psychiatric/Behavioral: Negative for dysphoric mood, sleep disturbance and suicidal ideas.       Objective:      Physical Exam   Constitutional: He is oriented to person, place, and time. He appears well-developed and well-nourished. No distress.   HENT:   Head: Normocephalic and atraumatic.   Right Ear: External ear normal.   Left Ear: External ear normal.   Mouth/Throat: Oropharynx is clear and moist. No oropharyngeal exudate.   TM's clear, pharynx clear   Eyes: Pupils are equal, round, and reactive to light. Conjunctivae and EOM are normal. No scleral icterus.   Neck: Normal range of motion. Neck supple. No thyromegaly present.   No supraclavicular nodes palpated   Cardiovascular: Normal rate, regular rhythm and normal heart  sounds.   No murmur heard.  Pulmonary/Chest: Effort normal. No stridor. No respiratory distress. He has wheezes (Few scattered wheezes throughout).   Abdominal: Soft. Bowel sounds are normal. He exhibits no mass. There is no tenderness.   Musculoskeletal: He exhibits no edema.   Lymphadenopathy:     He has no cervical adenopathy.   Neurological: He is alert and oriented to person, place, and time.   Skin: No rash noted. No erythema. No pallor.   Psychiatric: He has a normal mood and affect. His behavior is normal.       Assessment:       1. Acute bacterial bronchitis    2. Essential hypertension        Plan:       Derik was seen today for cough and nasal congestion.    Diagnoses and all orders for this visit:    Acute bacterial bronchitis    Essential hypertension    Other orders  -     doxycycline (VIBRAMYCIN) 100 MG Cap; Take 1 capsule (100 mg total) by mouth 2 (two) times daily. for 7 days  -     predniSONE (DELTASONE) 20 MG tablet; Take 1 tablet (20 mg total) by mouth As instructed.        Counseled on side effects and benefits of the medication.  Call if symptoms fail to improve or worsen

## 2019-10-17 ENCOUNTER — CLINICAL SUPPORT (OUTPATIENT)
Dept: INTERNAL MEDICINE | Facility: CLINIC | Age: 84
End: 2019-10-17
Payer: MEDICARE

## 2019-10-17 PROCEDURE — 90662 IIV NO PRSV INCREASED AG IM: CPT | Mod: PBBFAC

## 2019-12-06 RX ORDER — ALLOPURINOL 300 MG/1
300 TABLET ORAL DAILY
Qty: 90 TABLET | Refills: 3 | Status: SHIPPED | OUTPATIENT
Start: 2019-12-06 | End: 2020-09-03 | Stop reason: SDUPTHER

## 2020-08-06 NOTE — PROGRESS NOTES
Subjective:       Patient ID: Derik Wolfe is a 84 y.o. male.    Chief Complaint: Annual Exam    Patient here for annual assessment of hypertension, elevated glucose, gout.  Patient denies any chest pain shortness of breath fevers or chills.  He did slip and fall working around his house but landed on his butt.  No injury.    Chronic lower extremity edema with some venous stasis changes    He is due for a few vaccines.  He is due to see Dermatology.  He has some fungal changes to the right great toenail    No recent gout    Review of Systems   Constitutional: Negative for activity change, chills, fatigue, fever and unexpected weight change.   HENT: Negative for hearing loss, nosebleeds, rhinorrhea and trouble swallowing.    Eyes: Negative for pain, discharge and visual disturbance.   Respiratory: Negative for cough, chest tightness, shortness of breath and wheezing.    Cardiovascular: Positive for leg swelling. Negative for chest pain and palpitations.   Gastrointestinal: Negative for abdominal pain, blood in stool, constipation, diarrhea, nausea and vomiting.   Endocrine: Negative for polydipsia and polyuria.   Genitourinary: Negative for difficulty urinating, hematuria and urgency.   Musculoskeletal: Negative for arthralgias, joint swelling and neck pain.   Integumentary:  Negative for rash.        Toenail fungus   Neurological: Negative for dizziness, weakness and headaches.   Hematological: Does not bruise/bleed easily.   Psychiatric/Behavioral: Negative for confusion, dysphoric mood, sleep disturbance and suicidal ideas.         Objective:      Physical Exam  Constitutional:       General: He is not in acute distress.     Appearance: He is well-developed.   HENT:      Head: Normocephalic and atraumatic.      Right Ear: Tympanic membrane and ear canal normal.      Left Ear: Tympanic membrane and ear canal normal.      Nose: No congestion or rhinorrhea.      Mouth/Throat:      Pharynx: No oropharyngeal  exudate or posterior oropharyngeal erythema.   Eyes:      General: No scleral icterus.     Conjunctiva/sclera: Conjunctivae normal.      Pupils: Pupils are equal, round, and reactive to light.   Neck:      Musculoskeletal: Normal range of motion and neck supple.      Thyroid: No thyromegaly.      Comments: No supraclavicular nodes palpated  Cardiovascular:      Rate and Rhythm: Normal rate and regular rhythm.      Heart sounds: Normal heart sounds. No murmur.   Pulmonary:      Effort: Pulmonary effort is normal.      Breath sounds: Normal breath sounds. No wheezing.   Abdominal:      General: Bowel sounds are normal.      Palpations: Abdomen is soft. There is no mass.      Tenderness: There is no abdominal tenderness.   Musculoskeletal:      Right lower leg: Edema present.      Left lower leg: Edema present.   Lymphadenopathy:      Cervical: No cervical adenopathy.   Skin:     Coloration: Skin is not pale.      Comments: Onychomycosis right great toenail  Diffuse heterogeneous appearing skin lesions throughout the extremities and torso  Venous stasis changes   Neurological:      General: No focal deficit present.      Mental Status: He is alert and oriented to person, place, and time.   Psychiatric:         Mood and Affect: Mood normal.         Behavior: Behavior normal.         Assessment:       1. Essential hypertension    2. Routine physical examination    3. Elevated glucose    4. Gout, unspecified cause, unspecified chronicity, unspecified site    5. Skin exam, screening for cancer    6. Onychomycosis        Plan:       Derik was seen today for annual exam.    Diagnoses and all orders for this visit:    Essential hypertension  -     Hemoglobin A1C; Future  -     Basic metabolic panel; Future  -     Lipid Panel; Future    Routine physical examination  -     Hemoglobin A1C; Future  -     Basic metabolic panel; Future  -     Lipid Panel; Future    Elevated glucose  -     Hemoglobin A1C; Future  -     Basic  metabolic panel; Future  -     Lipid Panel; Future    Gout, unspecified cause, unspecified chronicity, unspecified site  -     Uric acid; Future    Skin exam, screening for cancer  -     Ambulatory referral/consult to Dermatology; Future    Onychomycosis  -     ciclopirox (PENLAC) 8 % Soln; Apply topically nightly. Remove with nail polish remover on a weekly basis and repeat.

## 2020-08-07 ENCOUNTER — TELEPHONE (OUTPATIENT)
Dept: INTERNAL MEDICINE | Facility: CLINIC | Age: 85
End: 2020-08-07

## 2020-08-07 ENCOUNTER — IMMUNIZATION (OUTPATIENT)
Dept: PHARMACY | Facility: CLINIC | Age: 85
End: 2020-08-07
Payer: MEDICARE

## 2020-08-07 ENCOUNTER — OFFICE VISIT (OUTPATIENT)
Dept: INTERNAL MEDICINE | Facility: CLINIC | Age: 85
End: 2020-08-07
Payer: MEDICARE

## 2020-08-07 VITALS
OXYGEN SATURATION: 97 % | SYSTOLIC BLOOD PRESSURE: 138 MMHG | WEIGHT: 251.31 LBS | HEART RATE: 67 BPM | BODY MASS INDEX: 35.05 KG/M2 | DIASTOLIC BLOOD PRESSURE: 78 MMHG

## 2020-08-07 DIAGNOSIS — R73.09 ELEVATED GLUCOSE: ICD-10-CM

## 2020-08-07 DIAGNOSIS — Z12.83 SKIN EXAM, SCREENING FOR CANCER: ICD-10-CM

## 2020-08-07 DIAGNOSIS — M10.9 GOUT, UNSPECIFIED CAUSE, UNSPECIFIED CHRONICITY, UNSPECIFIED SITE: ICD-10-CM

## 2020-08-07 DIAGNOSIS — Z00.00 ROUTINE PHYSICAL EXAMINATION: ICD-10-CM

## 2020-08-07 DIAGNOSIS — B35.1 ONYCHOMYCOSIS: ICD-10-CM

## 2020-08-07 DIAGNOSIS — I10 ESSENTIAL HYPERTENSION: Primary | ICD-10-CM

## 2020-08-07 PROCEDURE — G0009 ADMIN PNEUMOCOCCAL VACCINE: HCPCS | Mod: PBBFAC

## 2020-08-07 PROCEDURE — 99999 PR PBB SHADOW E&M-EST. PATIENT-LVL IV: CPT | Mod: PBBFAC,,, | Performed by: INTERNAL MEDICINE

## 2020-08-07 PROCEDURE — 99214 PR OFFICE/OUTPT VISIT, EST, LEVL IV, 30-39 MIN: ICD-10-PCS | Mod: S$PBB,,, | Performed by: INTERNAL MEDICINE

## 2020-08-07 PROCEDURE — 99214 OFFICE O/P EST MOD 30 MIN: CPT | Mod: S$PBB,,, | Performed by: INTERNAL MEDICINE

## 2020-08-07 PROCEDURE — 99214 OFFICE O/P EST MOD 30 MIN: CPT | Mod: PBBFAC,25 | Performed by: INTERNAL MEDICINE

## 2020-08-07 PROCEDURE — 99999 PR PBB SHADOW E&M-EST. PATIENT-LVL IV: ICD-10-PCS | Mod: PBBFAC,,, | Performed by: INTERNAL MEDICINE

## 2020-08-07 RX ORDER — CICLOPIROX 80 MG/ML
SOLUTION TOPICAL NIGHTLY
Qty: 6.6 ML | Refills: 1 | Status: SHIPPED | OUTPATIENT
Start: 2020-08-07 | End: 2022-07-18

## 2020-08-07 NOTE — TELEPHONE ENCOUNTER
Used 2 pt identifiers and reviewed allergies prior to giving [id number] injection in R/arm, VIS given then asked pt to wait 15 mins post injection for s/sx of adverse reactions.

## 2020-08-07 NOTE — PROGRESS NOTES
Used 2 pt identifiers and reviewed allergies prior to giving injection, VIS given then asked pt to wait 15 mins post injection for s/sx of adverse reactions.

## 2020-09-03 RX ORDER — ALLOPURINOL 300 MG/1
300 TABLET ORAL DAILY
Qty: 90 TABLET | Refills: 3 | Status: SHIPPED | OUTPATIENT
Start: 2020-09-03 | End: 2021-11-07 | Stop reason: SDUPTHER

## 2020-09-28 ENCOUNTER — PATIENT OUTREACH (OUTPATIENT)
Dept: ADMINISTRATIVE | Facility: OTHER | Age: 85
End: 2020-09-28

## 2020-09-30 ENCOUNTER — OFFICE VISIT (OUTPATIENT)
Dept: DERMATOLOGY | Facility: CLINIC | Age: 85
End: 2020-09-30
Payer: MEDICARE

## 2020-09-30 DIAGNOSIS — Z12.83 SKIN EXAM, SCREENING FOR CANCER: ICD-10-CM

## 2020-09-30 DIAGNOSIS — Z85.828 HISTORY OF NONMELANOMA SKIN CANCER: ICD-10-CM

## 2020-09-30 DIAGNOSIS — D48.5 NEOPLASM OF UNCERTAIN BEHAVIOR OF SKIN: ICD-10-CM

## 2020-09-30 DIAGNOSIS — I87.2 STASIS DERMATITIS OF BOTH LEGS: ICD-10-CM

## 2020-09-30 DIAGNOSIS — L82.1 SK (SEBORRHEIC KERATOSIS): Primary | ICD-10-CM

## 2020-09-30 DIAGNOSIS — L57.0 AK (ACTINIC KERATOSIS): ICD-10-CM

## 2020-09-30 PROCEDURE — 99999 PR PBB SHADOW E&M-EST. PATIENT-LVL III: ICD-10-PCS | Mod: PBBFAC,,, | Performed by: DERMATOLOGY

## 2020-09-30 PROCEDURE — 88305 TISSUE EXAM BY PATHOLOGIST: CPT | Mod: 26,,, | Performed by: DERMATOLOGY

## 2020-09-30 PROCEDURE — 17004 DESTROY PREMAL LESIONS 15/>: CPT | Mod: S$PBB,,, | Performed by: DERMATOLOGY

## 2020-09-30 PROCEDURE — 99999 PR PBB SHADOW E&M-EST. PATIENT-LVL III: CPT | Mod: PBBFAC,,, | Performed by: DERMATOLOGY

## 2020-09-30 PROCEDURE — 88305 TISSUE EXAM BY PATHOLOGIST: ICD-10-PCS | Mod: 26,,, | Performed by: DERMATOLOGY

## 2020-09-30 PROCEDURE — 17004 DESTROY PREMAL LESIONS 15/>: CPT | Mod: PBBFAC | Performed by: DERMATOLOGY

## 2020-09-30 PROCEDURE — 11102 PR TANGENTIAL BIOPSY, SKIN, SINGLE LESION: ICD-10-PCS | Mod: 59,S$PBB,, | Performed by: DERMATOLOGY

## 2020-09-30 PROCEDURE — 11102 TANGNTL BX SKIN SINGLE LES: CPT | Mod: 59,S$PBB,, | Performed by: DERMATOLOGY

## 2020-09-30 PROCEDURE — 11102 TANGNTL BX SKIN SINGLE LES: CPT | Mod: 59,PBBFAC | Performed by: DERMATOLOGY

## 2020-09-30 PROCEDURE — 99213 OFFICE O/P EST LOW 20 MIN: CPT | Mod: PBBFAC | Performed by: DERMATOLOGY

## 2020-09-30 PROCEDURE — 99214 PR OFFICE/OUTPT VISIT, EST, LEVL IV, 30-39 MIN: ICD-10-PCS | Mod: 25,S$PBB,, | Performed by: DERMATOLOGY

## 2020-09-30 PROCEDURE — 88305 TISSUE EXAM BY PATHOLOGIST: CPT | Performed by: DERMATOLOGY

## 2020-09-30 PROCEDURE — 17004 PR DESTRUCTION, PREMALIGNANT LESIONS; 15 OR MORE LESIONS: ICD-10-PCS | Mod: S$PBB,,, | Performed by: DERMATOLOGY

## 2020-09-30 PROCEDURE — 99214 OFFICE O/P EST MOD 30 MIN: CPT | Mod: 25,S$PBB,, | Performed by: DERMATOLOGY

## 2020-09-30 RX ORDER — TRIAMCINOLONE ACETONIDE 1 MG/G
OINTMENT TOPICAL
Qty: 454 G | Refills: 0 | Status: SHIPPED | OUTPATIENT
Start: 2020-09-30 | End: 2022-01-07 | Stop reason: SDUPTHER

## 2020-09-30 NOTE — LETTER
October 4, 2020      Girish Seals MD  [address]  [facility] [address]           [facility] [address]  [address]  [facility] [address]  Phone: [phone]  Fax: [phone]          Patient: Derik Wolfe   MR Number: [id number]   YOB: 1935   Date of Visit: 9/30/2020       Dear Dr. Girish Seals:    Thank you for referring Derik Wolfe to me for evaluation. Attached you will find relevant portions of my assessment and plan of care.    If you have questions, please do not hesitate to call me. I look forward to following Derik Wolfe along with you.    Sincerely,    Fernanda Hager MD    Enclosure  CC:  No Recipients    If you would like to receive this communication electronically, please contact [email or web address] or [phone] to request more information on [employer] Link access.    For providers and/or their staff who would like to refer a patient to [facility], please contact us through our one-stop-shop provider referral line, [facility], at [phone].    If you feel you have received this communication in error or would no longer like to receive these types of communications, please e-mail [email or web address]

## 2020-09-30 NOTE — PATIENT INSTRUCTIONS
Stasis dermatitis on both legs:     Recommend frequent leg elevation.    Recommend compression hose (at least 18mm HG).        Summer Sun Protection      The [facility] would like to remind you of the importance of sun protection all year round and particularly during the summer when the suns rays are the strongest. It has been proven that both acute and chronic sun exposure damages our cells and leads to skin cancer. Beyond skin cancer, the sun causes 90% of the symptoms of pre-mature skin aging, including wrinkles, lentigines (brown spots), and thin, easily bruised skin. Proper sun protection can help prevent these unwanted conditions.    Many patients report that the dont go in the sun. It has been shown that the average person receives 18 hours of incidental sun exposure per week during activities such as walking through parking lots, driving, or sitting next to windows. This accumulates to several bad sunburns per year!    In choosing sunscreen, you want one that protects against both UVA and UVB rays. It is recommended that you use one of SPF 30 or higher. It is important to apply the sunscreen about 20 minutes prior to sun exposure. Most sunscreens are chemical sunscreens and a reaction must take place in the skin so that they are effective. If they are applied and then you are immediately exposed to the sun or start sweating, this reaction has not had time to take place and you are therefore unprotected. Sunscreen needs to be reapplied every 2 hours if you are participating in water sports or sweating. We recommend Elta MD or Neutrogena Ultra Sheer Dry Touch SPF 55 for daily use; however there are many options and it is most important for you to find one that you will use on a consistent basis.    If you have sensitive skin, you may do best with a sunscreen that contains only physical blockers such as titanium dioxide or zinc oxide. These are typically thicker and harder to apply,  "however they afford very good protection. Neutrogena Sensitive Skin, Blue Lizard Sensitive Skin (pink top) or Neutrogena Pure and Free are popular ones.     Aside from sunscreen, clothes with UV protection, wide brimmed hats, and sunglasses are other means of sun protection that we recommend.                        [facility] - DERMATOLOGY [address]  [address]  [place] [address]  Dept: [phone]  Dept Fax: [phone]                                                                                Shave Biopsy Wound Care    Your doctor has performed a shave biopsy today.  A band aid and vaseline ointment has been placed over the site.  This should remain in place for 24 hours.  It is recommended that you keep the area dry for the first 24 hours.  After 24 hours, you may remove the band aid and wash the area with warm soap and water and apply Vaseline jelly.  Many patients prefer to use Neosporin or Bacitracin ointment.  This is acceptable; however, know that you can develop an allergy to this medication even if you have used it safely for years.  It is important to keep the area moist.  Letting it dry out and get air slows healing time, and will worsen the scar.  Band aid is optional after first 24 hours.      If you notice increasing redness, tenderness, pain, or yellow drainage at the biopsy site, please notify your doctor.  These are signs of an infection.    If your biopsy site is bleeding, apply firm pressure for 15 minutes straight.  Repeat for another 15 minutes, if it is still bleeding.   If the surgical site continues to bleed, then please contact your doctor.      For [facility] users:   You will receive a [facility] notification after the pathologist has finished reviewing your biopsy specimen. Pathology results, however, will not be released online so you will see a "no content" message. Once your dermatologist reviews and clinically correlates your biopsy " results, you will either receive a letter in the mail with the results of a phone call from your doctor's office if further explanation or treatment is warranted.       [address] [place] [address]/ [phone] FAX/ [email or web address]      CRYOSURGERY      Your doctor has used a method called cryosurgery to treat your skin condition. Cryosurgery refers to the use of very cold substances to treat a variety of skin conditions such as warts, pre-skin cancers, molluscum contagiosum, sun spots, and several benign growths. The substance we use in cryosurgery is liquid nitrogen and is so cold (-195 degrees Celsius) that is burns when administered.     Following treatment in the office, the skin may immediately burn and become red. You may find the area around the lesion is affected as well. It is sometimes necessary to treat not only the lesion, but a small area of the surrounding normal skin to achieve a good response.     A blister, and even a blood filled blister, may form after treatment.   This is a normal response. If the blister is painful, it is acceptable to sterilize a needle and with rubbing alcohol and gently pop the blister. It is important that you gently wash the area with soap and warm water as the blister fluid may contain wart virus if a wart was treated. Do no remove the roof of the blister.     The area treated can take anywhere from 1-3 weeks to heal. Healing time depends on the kind skin lesion treated, the location, and how aggressively the lesion was treated. It is recommended that the areas treated are covered with Vaseline or bacitracin ointment and a band-aid. If a band-aid is not practical, just ointment applied several times per day will do. Keeping these areas moist will speed the healing time.    Treatment with liquid nitrogen can leave a scar. In dark skin, it may be a light or dark scar, in light skin it may be a white or pink scar. These will generally fade  with time, but may never go away completely.     If you have any concerns after your treatment, please feel free to call the office.       [facility] [facility] [place] [address]/ [phone] FAX/ [email or web address]

## 2020-09-30 NOTE — PROGRESS NOTES
Subjective:       Patient ID:  Derik Wolfe is a 85 y.o. male who presents for   Chief Complaint   Patient presents with    Skin Check     tbse     HPI     Pt presents today for TBSE. Used Efudex on face after last visit with good response.  Pt c/o multiple spots, legs,scaly, rough, Tx. Lubriderm    Has used efudex in the past on arms and forehead.     Recent skin history:  SCC excised from R upper back in August 2018.  SCC excised from R upper nose by Dr. Jones in 6/2014.    Review of Systems   Constitutional: Negative for fever, chills, weight loss, weight gain, fatigue, night sweats and malaise.   Respiratory: Negative for cough and shortness of breath.    Gastrointestinal: Negative for nausea and vomiting.   Skin: Positive for dry skin. Negative for daily sunscreen use and activity-related sunscreen use.   Hematologic/Lymphatic: Bruises/bleeds easily.        Objective:    Physical Exam   Constitutional: He appears well-developed and well-nourished. No distress.   Neurological: He is alert and oriented to person, place, and time. He is not disoriented.   Psychiatric: He has a normal mood and affect.   Skin:   Areas Examined (abnormalities noted in diagram):   Scalp / Hair Palpated and Inspected  Head / Face Inspection Performed  Neck Inspection Performed  Chest / Axilla Inspection Performed  Abdomen Inspection Performed  Genitals / Buttocks / Groin Inspection Performed  Back Inspection Performed  RUE Inspected  LUE Inspection Performed  RLE Inspected  LLE Inspection Performed                           Diagram Legend     Erythematous scaling macule/papule c/w actinic keratosis       Vascular papule c/w angioma      Pigmented verrucoid papule/plaque c/w seborrheic keratosis      Yellow umbilicated papule c/w sebaceous hyperplasia      Irregularly shaped tan macule c/w lentigo     1-2 mm smooth white papules consistent with Milia      Movable subcutaneous cyst with punctum c/w epidermal inclusion cyst       Subcutaneous movable cyst c/w pilar cyst      Firm pink to brown papule c/w dermatofibroma      Pedunculated fleshy papule(s) c/w skin tag(s)      Evenly pigmented macule c/w junctional nevus     Mildly variegated pigmented, slightly irregular-bordered macule c/w mildly atypical nevus      Flesh colored to evenly pigmented papule c/w intradermal nevus       Pink pearly papule/plaque c/w basal cell carcinoma      Erythematous hyperkeratotic cursted plaque c/w SCC      Surgical scar with no sign of skin cancer recurrence      Open and closed comedones      Inflammatory papules and pustules      Verrucoid papule consistent consistent with wart     Erythematous eczematous patches and plaques     Dystrophic onycholytic nail with subungual debris c/w onychomycosis     Umbilicated papule    Erythematous-base heme-crusted tan verrucoid plaque consistent with inflamed seborrheic keratosis     Erythematous Silvery Scaling Plaque c/w Psoriasis     See annotation      Assessment / Plan:      Neoplasm of uncertain behavior of skin  Shave biopsy procedure note:    Shave biopsy performed after verbal consent including risk of infection, scar, recurrence, need for additional treatment of site. Area prepped with alcohol, anesthetized with approximately 1.0cc of 1% lidocaine with epinephrine. Lesional tissue shaved with razor blade. Hemostasis achieved with application of aluminum chloride followed by hyfrecation. No complications. Dressing applied. Wound care explained.    -     Specimen to Pathology, Dermatology  Pathology Orders:     Normal Orders This Visit    Specimen to Pathology, Dermatology     Comments:    Number of Specimens:->1  ------------------------->-------------------------  Spec 1 Procedure:->Biopsy  Spec 1 Clinical Impression:->r/o lentigo vs melanoma vs SK vs  other  Spec 1 Source:->L upper arm    Questions:    Procedure Type: Dermatology and skin neoplasms    Number of Specimens: 1    ------------------------:  -------------------------    Spec 1 Procedure: Biopsy    Spec 1 Clinical Impression: r/o lentigo vs melanoma vs SK vs other    Spec 1 Source: L upper arm        SK (seborrheic keratosis)  These are benign inherited growths without a malignant potential. Reassurance given to patient. No treatment is necessary.   Treatment of benign, asymptomatic lesions may be considered cosmetic.  Warned about risk of hypo- or hyperpigmentation with treatment and risk of recurrence.      AK (actinic keratosis)  Cryosurgery Procedure Note    Verbal consent from the patient is obtained including, but not limited to, risk of hypopigmentation/hyperpigmentation, scar, recurrence of lesion. The patient is aware of the precancerous quality and need for treatment of these lesions. Liquid nitrogen cryosurgery is applied to the 18 actinic keratoses, as detailed in the physical exam, to produce a freeze injury. The patient is aware that blisters may form and is instructed on wound care with gentle cleansing and use of vaseline ointment to keep moist until healed. The patient is supplied a handout on cryosurgery and is instructed to call if lesions do not completely resolve.    History of nonmelanoma skin cancer  Skin exam, screening for cancer  Total body skin examination performed today including at least 12 points as noted in physical examination. Suspicious lesions noted above.  Patient instructed in importance of daily broad spectrum sunscreen use with spf at least 30. Sun avoidance and topical protection/protective clothing discussed.      Stasis dermatitis of both legs  -     triamcinolone acetonide 0.1% (KENALOG) 0.1 % ointment; AAA on lower legs BID x 1-2 wks then prn flares only  Dispense: 454 g; Refill: 0  Recommend frequent leg elevation.  Recommend compression hose (at least 18mm HG).      Follow up in about 4 months (around 1/30/2021) for skin check or sooner pending biopsy results.

## 2020-10-06 LAB
FINAL PATHOLOGIC DIAGNOSIS: NORMAL
GROSS: NORMAL
MICROSCOPIC EXAM: NORMAL

## 2020-10-23 ENCOUNTER — PROCEDURE VISIT (OUTPATIENT)
Dept: DERMATOLOGY | Facility: CLINIC | Age: 85
End: 2020-10-23
Payer: MEDICARE

## 2020-10-23 DIAGNOSIS — C44.629 SQUAMOUS CELL CARCINOMA OF ARM, LEFT: Primary | ICD-10-CM

## 2020-10-23 PROCEDURE — 88305 TISSUE EXAM BY PATHOLOGIST: ICD-10-PCS | Mod: 26,,, | Performed by: PATHOLOGY

## 2020-10-23 PROCEDURE — 99499 NO LOS: ICD-10-PCS | Mod: S$PBB,,, | Performed by: DERMATOLOGY

## 2020-10-23 PROCEDURE — 12032 INTMD RPR S/A/T/EXT 2.6-7.5: CPT | Mod: S$PBB,,, | Performed by: DERMATOLOGY

## 2020-10-23 PROCEDURE — 88305 TISSUE EXAM BY PATHOLOGIST: CPT | Performed by: PATHOLOGY

## 2020-10-23 PROCEDURE — 11602 EXC TR-EXT MAL+MARG 1.1-2 CM: CPT | Mod: PBBFAC | Performed by: DERMATOLOGY

## 2020-10-23 PROCEDURE — 11602 PR EXC SKIN MALIG 1.1-2 CM TRUNK,ARM,LEG: ICD-10-PCS | Mod: S$PBB,51,, | Performed by: DERMATOLOGY

## 2020-10-23 PROCEDURE — 11602 EXC TR-EXT MAL+MARG 1.1-2 CM: CPT | Mod: S$PBB,51,, | Performed by: DERMATOLOGY

## 2020-10-23 PROCEDURE — 88305 TISSUE EXAM BY PATHOLOGIST: CPT | Mod: 26,,, | Performed by: PATHOLOGY

## 2020-10-23 PROCEDURE — 12032 PR LAYR CLOS WND TRUNK,ARM,LEG 2.6-7.5 CM: ICD-10-PCS | Mod: S$PBB,,, | Performed by: DERMATOLOGY

## 2020-10-23 PROCEDURE — 99499 UNLISTED E&M SERVICE: CPT | Mod: S$PBB,,, | Performed by: DERMATOLOGY

## 2020-10-23 PROCEDURE — 12032 INTMD RPR S/A/T/EXT 2.6-7.5: CPT | Mod: PBBFAC | Performed by: DERMATOLOGY

## 2020-10-23 NOTE — PATIENT INSTRUCTIONS
"Post- Operative Wound Care    Your doctor has performed local skin surgery today.  Vaseline ointment and a pressure bandage were placed after the surgery.  It is very important that you keep this bandage in place for 24 hours.  This will decrease the risk of post - operative infection and bleeding.  After 24 hours, you may remove the band aid and wash the area with warm soap and water and apply Vaseline ointment.  Many patients prefer to use Neosporin or Bacitracin ointment.  This is acceptable; however know that you can develop an allergy to this medication even if you have used it safely for years.  It is important to keep the area moist.  Letting it dry out and get air slows healing time, will worsen the scar, and make it more difficult to remove the stitches.  Band aid is optional after first 24 hours.        If you notice increasing redness, tenderness, pain, or yellow drainage at the biopsy or surgical site, please notify your doctor.  These are signs of an infection.    If your biopsy/surgical site is bleeding, apply firm pressure for 15 minutes straight.  Repeat for another 15 minutes, if it is still bleeding.   If the surgical site continues to bleed, then please contact your doctor.      For [facility] users:   You will receive a [facility] notification after the pathologist has finished reviewing your biopsy specimen. Pathology results, however, will not be released online so you will see a "no content" message. Once your dermatologist reviews and clinically correlates your biopsy results, you will either receive a letter in the mail with the results of a phone call from your doctor's office if further explanation or treatment is warranted.       [address] [place] [address]/ [phone] FAX/ [email or web address]         "

## 2020-10-23 NOTE — PROGRESS NOTES
PROCEDURE: Elliptical excision with intermediate layered repair in order to decrease dead space and decrease tension.    ANESTHETIC: 6.5 cc 1% Xylocaine with Epinephrine 1:100,000, buffered    SURGEON: Fernanda Hager M.D.    ASSISTANTS: Carl Zamora LPN    PREOPERATIVE DIAGNOSIS:  Biopsy-proven Squamous Cell Carcinoma    POSTOPERATIVE DIAGNOSIS:  Same as preoperative diagnosis    PATHOLOGIC DIAGNOSIS: Pending    LOCATION: L upper arm    INITIAL LESION SIZE: 1.1 cm    EXCISED DIAMETER: 1.9 cm    PREPARATION: The diagnosis, procedure, alternatives, benefits and risks, including but not limited to: infection, bleeding/bruising, drug reactions, pain, scar or cosmetic defect, local sensation disturbances, wound dehiscence (separation of wound edges after sutures removed) and/or recurrence of present condition were explained to the patient. The patient elected to proceed.  Patient's identity was verified using 2 patient identifiers and the side and site was verified.  Time out period with surgeon, assistant and patient in surgical suite was taken.    PROCEDURE: The location noted above was prepped, draped, and anesthetized in the usual sterile fashion per Carl Zamora LPN. Lesional tissue was carefully marked with at least 4 mm margins of clinically normal skin in all directions. A fusiform elliptical excision was done with #15 blade carried down completely through the dermis into the deep subcutaneous tissues to the level of the non-muscle fascia, and dissection was carried out in that plane.  Electrocoagulation was used to obtain hemostasis. Blood loss was minimal. The wound was then approximated in a layered fashion with subcutaneous and intradermal sutures of 4.0 Monocryl, approximately 5 in number, and the wound was then superficially closed with simple interrupted sutures of 3.0 Prolene.    The patient tolerated the procedure well.    The area was cleaned and dressed appropriately and the patient was given wound  care instructions, as well as an appointment for follow-up evaluation.    LENGTH OF REPAIR: 5 cm    rtc 2 wks for suture removal

## 2020-10-28 LAB
FINAL PATHOLOGIC DIAGNOSIS: NORMAL
GROSS: NORMAL

## 2021-01-08 ENCOUNTER — PATIENT MESSAGE (OUTPATIENT)
Dept: INTERNAL MEDICINE | Facility: CLINIC | Age: 86
End: 2021-01-08

## 2021-01-11 ENCOUNTER — OFFICE VISIT (OUTPATIENT)
Dept: INTERNAL MEDICINE | Facility: CLINIC | Age: 86
End: 2021-01-11
Payer: MEDICARE

## 2021-01-11 ENCOUNTER — LAB VISIT (OUTPATIENT)
Dept: INTERNAL MEDICINE | Facility: CLINIC | Age: 86
End: 2021-01-11
Payer: MEDICARE

## 2021-01-11 VITALS
HEART RATE: 84 BPM | SYSTOLIC BLOOD PRESSURE: 138 MMHG | HEIGHT: 71 IN | DIASTOLIC BLOOD PRESSURE: 86 MMHG | TEMPERATURE: 98 F | BODY MASS INDEX: 35.18 KG/M2 | OXYGEN SATURATION: 94 % | WEIGHT: 251.31 LBS

## 2021-01-11 DIAGNOSIS — I10 ESSENTIAL HYPERTENSION: ICD-10-CM

## 2021-01-11 DIAGNOSIS — R05.9 COUGH: Primary | ICD-10-CM

## 2021-01-11 DIAGNOSIS — R09.81 HEAD CONGESTION: ICD-10-CM

## 2021-01-11 DIAGNOSIS — R09.89 CHEST CONGESTION: ICD-10-CM

## 2021-01-11 DIAGNOSIS — R05.9 COUGH: ICD-10-CM

## 2021-01-11 PROCEDURE — 99999 PR PBB SHADOW E&M-EST. PATIENT-LVL IV: ICD-10-PCS | Mod: PBBFAC,,, | Performed by: INTERNAL MEDICINE

## 2021-01-11 PROCEDURE — 99213 PR OFFICE/OUTPT VISIT, EST, LEVL III, 20-29 MIN: ICD-10-PCS | Mod: S$PBB,,, | Performed by: INTERNAL MEDICINE

## 2021-01-11 PROCEDURE — 99213 OFFICE O/P EST LOW 20 MIN: CPT | Mod: S$PBB,,, | Performed by: INTERNAL MEDICINE

## 2021-01-11 PROCEDURE — 99214 OFFICE O/P EST MOD 30 MIN: CPT | Mod: PBBFAC | Performed by: INTERNAL MEDICINE

## 2021-01-11 PROCEDURE — 99999 PR PBB SHADOW E&M-EST. PATIENT-LVL IV: CPT | Mod: PBBFAC,,, | Performed by: INTERNAL MEDICINE

## 2021-01-11 PROCEDURE — U0003 INFECTIOUS AGENT DETECTION BY NUCLEIC ACID (DNA OR RNA); SEVERE ACUTE RESPIRATORY SYNDROME CORONAVIRUS 2 (SARS-COV-2) (CORONAVIRUS DISEASE [COVID-19]), AMPLIFIED PROBE TECHNIQUE, MAKING USE OF HIGH THROUGHPUT TECHNOLOGIES AS DESCRIBED BY CMS-2020-01-R: HCPCS

## 2021-01-11 RX ORDER — BENZONATATE 100 MG/1
100 CAPSULE ORAL 3 TIMES DAILY PRN
Qty: 30 CAPSULE | Refills: 0 | Status: SHIPPED | OUTPATIENT
Start: 2021-01-11 | End: 2021-01-21

## 2021-01-11 RX ORDER — DOXYCYCLINE 100 MG/1
100 CAPSULE ORAL 2 TIMES DAILY
Qty: 20 CAPSULE | Refills: 0 | Status: SHIPPED | OUTPATIENT
Start: 2021-01-11 | End: 2021-01-22 | Stop reason: SDUPTHER

## 2021-01-13 LAB — SARS-COV-2 RNA RESP QL NAA+PROBE: NOT DETECTED

## 2021-01-20 ENCOUNTER — TELEPHONE (OUTPATIENT)
Dept: RESEARCH | Facility: HOSPITAL | Age: 86
End: 2021-01-20

## 2021-01-22 ENCOUNTER — PATIENT MESSAGE (OUTPATIENT)
Dept: INTERNAL MEDICINE | Facility: CLINIC | Age: 86
End: 2021-01-22

## 2021-01-22 ENCOUNTER — PATIENT MESSAGE (OUTPATIENT)
Dept: ADMINISTRATIVE | Facility: OTHER | Age: 86
End: 2021-01-22

## 2021-01-22 RX ORDER — DOXYCYCLINE 100 MG/1
100 CAPSULE ORAL 2 TIMES DAILY
Qty: 14 CAPSULE | Refills: 0 | Status: SHIPPED | OUTPATIENT
Start: 2021-01-22 | End: 2021-01-29

## 2021-02-11 ENCOUNTER — IMMUNIZATION (OUTPATIENT)
Dept: PHARMACY | Facility: CLINIC | Age: 86
End: 2021-02-11
Payer: MEDICARE

## 2021-02-11 DIAGNOSIS — Z23 NEED FOR VACCINATION: Primary | ICD-10-CM

## 2021-03-11 ENCOUNTER — IMMUNIZATION (OUTPATIENT)
Dept: PHARMACY | Facility: CLINIC | Age: 86
End: 2021-03-11
Payer: MEDICARE

## 2021-03-11 DIAGNOSIS — Z23 NEED FOR VACCINATION: Primary | ICD-10-CM

## 2021-03-30 ENCOUNTER — PATIENT MESSAGE (OUTPATIENT)
Dept: DERMATOLOGY | Facility: CLINIC | Age: 86
End: 2021-03-30

## 2021-03-30 DIAGNOSIS — L57.0 MULTIPLE ACTINIC KERATOSES: ICD-10-CM

## 2021-03-31 RX ORDER — FLUOROURACIL 50 MG/G
CREAM TOPICAL
Qty: 40 G | Refills: 1 | OUTPATIENT
Start: 2021-03-31

## 2021-11-06 ENCOUNTER — PATIENT MESSAGE (OUTPATIENT)
Dept: INTERNAL MEDICINE | Facility: CLINIC | Age: 86
End: 2021-11-06
Payer: MEDICARE

## 2021-11-07 RX ORDER — ALLOPURINOL 300 MG/1
300 TABLET ORAL DAILY
Qty: 90 TABLET | Refills: 3 | Status: SHIPPED | OUTPATIENT
Start: 2021-11-07 | End: 2022-12-19

## 2021-11-07 RX ORDER — ATENOLOL 25 MG/1
25 TABLET ORAL DAILY
Qty: 90 TABLET | Refills: 3 | Status: SHIPPED | OUTPATIENT
Start: 2021-11-07 | End: 2023-02-06 | Stop reason: SDUPTHER

## 2021-11-08 ENCOUNTER — PATIENT MESSAGE (OUTPATIENT)
Dept: INTERNAL MEDICINE | Facility: CLINIC | Age: 86
End: 2021-11-08
Payer: MEDICARE

## 2021-11-08 RX ORDER — ATENOLOL 25 MG/1
TABLET ORAL
Qty: 90 TABLET | Refills: 0 | OUTPATIENT
Start: 2021-11-08

## 2021-12-16 ENCOUNTER — IMMUNIZATION (OUTPATIENT)
Dept: PHARMACY | Facility: CLINIC | Age: 86
End: 2021-12-16
Payer: MEDICARE

## 2021-12-16 DIAGNOSIS — Z23 NEED FOR VACCINATION: Primary | ICD-10-CM

## 2022-01-07 ENCOUNTER — LAB VISIT (OUTPATIENT)
Dept: LAB | Facility: HOSPITAL | Age: 87
End: 2022-01-07
Attending: INTERNAL MEDICINE
Payer: MEDICARE

## 2022-01-07 ENCOUNTER — OFFICE VISIT (OUTPATIENT)
Dept: INTERNAL MEDICINE | Facility: CLINIC | Age: 87
End: 2022-01-07
Payer: MEDICARE

## 2022-01-07 VITALS
HEIGHT: 71 IN | DIASTOLIC BLOOD PRESSURE: 68 MMHG | HEART RATE: 76 BPM | BODY MASS INDEX: 35.18 KG/M2 | SYSTOLIC BLOOD PRESSURE: 138 MMHG | OXYGEN SATURATION: 98 % | WEIGHT: 251.31 LBS

## 2022-01-07 DIAGNOSIS — I87.2 STASIS DERMATITIS OF BOTH LEGS: ICD-10-CM

## 2022-01-07 DIAGNOSIS — M10.9 GOUT, UNSPECIFIED CAUSE, UNSPECIFIED CHRONICITY, UNSPECIFIED SITE: ICD-10-CM

## 2022-01-07 DIAGNOSIS — R73.09 ELEVATED GLUCOSE LEVEL: ICD-10-CM

## 2022-01-07 DIAGNOSIS — I10 ESSENTIAL HYPERTENSION: Primary | ICD-10-CM

## 2022-01-07 DIAGNOSIS — E66.01 SEVERE OBESITY (BMI 35.0-39.9) WITH COMORBIDITY: ICD-10-CM

## 2022-01-07 DIAGNOSIS — I10 ESSENTIAL HYPERTENSION: ICD-10-CM

## 2022-01-07 DIAGNOSIS — I10 PRIMARY HYPERTENSION: ICD-10-CM

## 2022-01-07 LAB
ALBUMIN SERPL BCP-MCNC: 3.9 G/DL (ref 3.5–5.2)
ALP SERPL-CCNC: 58 U/L (ref 55–135)
ALT SERPL W/O P-5'-P-CCNC: 24 U/L (ref 10–44)
ANION GAP SERPL CALC-SCNC: 14 MMOL/L (ref 8–16)
AST SERPL-CCNC: 25 U/L (ref 10–40)
BILIRUB SERPL-MCNC: 1.5 MG/DL (ref 0.1–1)
BUN SERPL-MCNC: 10 MG/DL (ref 8–23)
CALCIUM SERPL-MCNC: 10.3 MG/DL (ref 8.7–10.5)
CHLORIDE SERPL-SCNC: 102 MMOL/L (ref 95–110)
CHOLEST SERPL-MCNC: 142 MG/DL (ref 120–199)
CHOLEST/HDLC SERPL: 3.6 {RATIO} (ref 2–5)
CO2 SERPL-SCNC: 26 MMOL/L (ref 23–29)
CREAT SERPL-MCNC: 0.7 MG/DL (ref 0.5–1.4)
EST. GFR  (AFRICAN AMERICAN): >60 ML/MIN/1.73 M^2
EST. GFR  (NON AFRICAN AMERICAN): >60 ML/MIN/1.73 M^2
ESTIMATED AVG GLUCOSE: 114 MG/DL (ref 68–131)
GLUCOSE SERPL-MCNC: 111 MG/DL (ref 70–110)
HBA1C MFR BLD: 5.6 % (ref 4–5.6)
HDLC SERPL-MCNC: 40 MG/DL (ref 40–75)
HDLC SERPL: 28.2 % (ref 20–50)
LDLC SERPL CALC-MCNC: 86.6 MG/DL (ref 63–159)
NONHDLC SERPL-MCNC: 102 MG/DL
POTASSIUM SERPL-SCNC: 4.5 MMOL/L (ref 3.5–5.1)
PROT SERPL-MCNC: 7 G/DL (ref 6–8.4)
SODIUM SERPL-SCNC: 142 MMOL/L (ref 136–145)
TRIGL SERPL-MCNC: 77 MG/DL (ref 30–150)
TSH SERPL DL<=0.005 MIU/L-ACNC: 1.86 UIU/ML (ref 0.4–4)
URATE SERPL-MCNC: 5.5 MG/DL (ref 3.4–7)

## 2022-01-07 PROCEDURE — 99999 PR PBB SHADOW E&M-EST. PATIENT-LVL III: CPT | Mod: PBBFAC,,, | Performed by: INTERNAL MEDICINE

## 2022-01-07 PROCEDURE — 80061 LIPID PANEL: CPT | Performed by: INTERNAL MEDICINE

## 2022-01-07 PROCEDURE — 99999 PR PBB SHADOW E&M-EST. PATIENT-LVL III: ICD-10-PCS | Mod: PBBFAC,,, | Performed by: INTERNAL MEDICINE

## 2022-01-07 PROCEDURE — 99214 OFFICE O/P EST MOD 30 MIN: CPT | Mod: S$PBB,,, | Performed by: INTERNAL MEDICINE

## 2022-01-07 PROCEDURE — 80053 COMPREHEN METABOLIC PANEL: CPT | Performed by: INTERNAL MEDICINE

## 2022-01-07 PROCEDURE — 36415 COLL VENOUS BLD VENIPUNCTURE: CPT | Performed by: INTERNAL MEDICINE

## 2022-01-07 PROCEDURE — 84550 ASSAY OF BLOOD/URIC ACID: CPT | Performed by: INTERNAL MEDICINE

## 2022-01-07 PROCEDURE — 99213 OFFICE O/P EST LOW 20 MIN: CPT | Mod: PBBFAC | Performed by: INTERNAL MEDICINE

## 2022-01-07 PROCEDURE — 83036 HEMOGLOBIN GLYCOSYLATED A1C: CPT | Performed by: INTERNAL MEDICINE

## 2022-01-07 PROCEDURE — 99214 PR OFFICE/OUTPT VISIT, EST, LEVL IV, 30-39 MIN: ICD-10-PCS | Mod: S$PBB,,, | Performed by: INTERNAL MEDICINE

## 2022-01-07 PROCEDURE — 84443 ASSAY THYROID STIM HORMONE: CPT | Performed by: INTERNAL MEDICINE

## 2022-01-07 RX ORDER — FUROSEMIDE 20 MG/1
TABLET ORAL
Qty: 30 TABLET | Refills: 12 | Status: SHIPPED | OUTPATIENT
Start: 2022-01-07

## 2022-01-07 RX ORDER — TRIAMCINOLONE ACETONIDE 1 MG/G
OINTMENT TOPICAL
Qty: 454 G | Refills: 0 | Status: SHIPPED | OUTPATIENT
Start: 2022-01-07 | End: 2023-06-26 | Stop reason: SDUPTHER

## 2022-01-07 NOTE — PROGRESS NOTES
Subjective:       Patient ID: Derik Wolfe is a 86 y.o. male.    Chief Complaint: Annual Exam    Patient here to follow-up hypertension, gout, borderline glucose, obesity.  He has been a little down because his daughter is in the ICU and she has a 22-year-old child with some special needs.  He says he worries a bit what might happen to the young man if something happened to his daughter.  We stressed the importance of trying to lose weight.   reviewed.  He needed some prescriptions refilled.  He uses Lasix very sparingly for significant leg swelling or after heavy soft the meal.  We will update blood work.  No recent history of gout attacks    Review of Systems   Constitutional: Negative for activity change and unexpected weight change.   HENT: Negative for hearing loss, rhinorrhea and trouble swallowing.    Eyes: Negative for discharge and visual disturbance.   Respiratory: Negative for chest tightness and wheezing.    Cardiovascular: Positive for leg swelling. Negative for chest pain and palpitations.   Gastrointestinal: Negative for blood in stool, constipation, diarrhea and vomiting.   Endocrine: Negative for polydipsia and polyuria.   Genitourinary: Negative for difficulty urinating, hematuria and urgency.   Musculoskeletal: Positive for arthralgias. Negative for joint swelling and neck pain.   Neurological: Negative for weakness and headaches.   Psychiatric/Behavioral: Negative for confusion and dysphoric mood. The patient is nervous/anxious.          Objective:      Physical Exam  Constitutional:       General: He is not in acute distress.     Appearance: He is well-developed.   HENT:      Head: Normocephalic and atraumatic.      Right Ear: Tympanic membrane, ear canal and external ear normal.      Left Ear: Tympanic membrane, ear canal and external ear normal.      Mouth/Throat:      Pharynx: No oropharyngeal exudate or posterior oropharyngeal erythema.   Eyes:      General: No scleral icterus.      Conjunctiva/sclera: Conjunctivae normal.      Pupils: Pupils are equal, round, and reactive to light.   Neck:      Thyroid: No thyromegaly.      Comments: No supraclavicular nodes palpated  Cardiovascular:      Rate and Rhythm: Normal rate and regular rhythm.      Pulses: Normal pulses.      Heart sounds: Normal heart sounds. No murmur heard.      Pulmonary:      Effort: Pulmonary effort is normal.      Breath sounds: Normal breath sounds. No wheezing.   Abdominal:      General: Bowel sounds are normal.      Palpations: Abdomen is soft. There is no mass.      Tenderness: There is no abdominal tenderness.   Musculoskeletal:         General: No tenderness.      Cervical back: Normal range of motion and neck supple.      Right lower leg: Edema (trace) present.      Left lower leg: Edema (trace) present.   Lymphadenopathy:      Cervical: No cervical adenopathy.   Skin:     Coloration: Skin is not jaundiced or pale.   Neurological:      General: No focal deficit present.      Mental Status: He is alert and oriented to person, place, and time.   Psychiatric:         Mood and Affect: Mood normal.         Behavior: Behavior normal.         Assessment:       Problem List Items Addressed This Visit        Cardiac/Vascular    Hypertension    Relevant Orders    Lipid Panel    TSH    Hemoglobin A1C    Comprehensive Metabolic Panel       Endocrine    Severe obesity (BMI 35.0-39.9) with comorbidity    Relevant Orders    Lipid Panel    TSH    Hemoglobin A1C    Comprehensive Metabolic Panel      Other Visit Diagnoses     Essential hypertension    -  Primary    Relevant Orders    Lipid Panel    TSH    Hemoglobin A1C    Comprehensive Metabolic Panel    Stasis dermatitis of both legs        Relevant Medications    triamcinolone acetonide 0.1% (KENALOG) 0.1 % ointment    Other Relevant Orders    Lipid Panel    TSH    Hemoglobin A1C    Comprehensive Metabolic Panel    Gout, unspecified cause, unspecified chronicity, unspecified site         Relevant Orders    Lipid Panel    TSH    Hemoglobin A1C    Comprehensive Metabolic Panel    Uric Acid    Elevated glucose level        Relevant Orders    Hemoglobin A1C          Plan:       Derik was seen today for annual exam.    Diagnoses and all orders for this visit:    Essential hypertension  -     Lipid Panel; Future  -     TSH; Future  -     Hemoglobin A1C; Future  -     Comprehensive Metabolic Panel; Future    Severe obesity (BMI 35.0-39.9) with comorbidity  -     Lipid Panel; Future  -     TSH; Future  -     Hemoglobin A1C; Future  -     Comprehensive Metabolic Panel; Future    Primary hypertension  -     Lipid Panel; Future  -     TSH; Future  -     Hemoglobin A1C; Future  -     Comprehensive Metabolic Panel; Future    Stasis dermatitis of both legs  -     Lipid Panel; Future  -     TSH; Future  -     Hemoglobin A1C; Future  -     Comprehensive Metabolic Panel; Future  -     triamcinolone acetonide 0.1% (KENALOG) 0.1 % ointment; AAA on lower legs BID x 1-2 wks then prn flares only    Gout, unspecified cause, unspecified chronicity, unspecified site  -     Lipid Panel; Future  -     TSH; Future  -     Hemoglobin A1C; Future  -     Comprehensive Metabolic Panel; Future  -     Uric Acid; Future    Elevated glucose level  -     Hemoglobin A1C; Future    Other orders  -     furosemide (LASIX) 20 MG tablet; Take one daily as directed for fluid accumulation        Uses Furosemide sparingly.

## 2022-01-09 ENCOUNTER — PATIENT MESSAGE (OUTPATIENT)
Dept: INTERNAL MEDICINE | Facility: CLINIC | Age: 87
End: 2022-01-09
Payer: MEDICARE

## 2022-01-09 DIAGNOSIS — R17 ELEVATED BILIRUBIN: Primary | ICD-10-CM

## 2022-01-12 ENCOUNTER — PATIENT MESSAGE (OUTPATIENT)
Dept: INTERNAL MEDICINE | Facility: CLINIC | Age: 87
End: 2022-01-12
Payer: MEDICARE

## 2022-01-12 ENCOUNTER — HOSPITAL ENCOUNTER (OUTPATIENT)
Dept: RADIOLOGY | Facility: HOSPITAL | Age: 87
Discharge: HOME | End: 2022-01-12
Attending: INTERNAL MEDICINE
Payer: MEDICARE

## 2022-01-12 DIAGNOSIS — R17 ELEVATED BILIRUBIN: ICD-10-CM

## 2022-01-12 PROCEDURE — 76700 US ABDOMEN COMPLETE: ICD-10-PCS | Mod: 26,,, | Performed by: INTERNAL MEDICINE

## 2022-01-12 PROCEDURE — 76700 US EXAM ABDOM COMPLETE: CPT | Mod: TC

## 2022-01-12 PROCEDURE — 76700 US EXAM ABDOM COMPLETE: CPT | Mod: 26,,, | Performed by: INTERNAL MEDICINE

## 2022-01-13 ENCOUNTER — PATIENT MESSAGE (OUTPATIENT)
Dept: INTERNAL MEDICINE | Facility: CLINIC | Age: 87
End: 2022-01-13
Payer: MEDICARE

## 2022-06-29 ENCOUNTER — OFFICE VISIT (OUTPATIENT)
Dept: URGENT CARE | Facility: CLINIC | Age: 87
End: 2022-06-29
Payer: MEDICARE

## 2022-06-29 VITALS
WEIGHT: 251 LBS | HEIGHT: 71 IN | OXYGEN SATURATION: 97 % | SYSTOLIC BLOOD PRESSURE: 145 MMHG | RESPIRATION RATE: 20 BRPM | TEMPERATURE: 99 F | HEART RATE: 68 BPM | BODY MASS INDEX: 35.14 KG/M2 | DIASTOLIC BLOOD PRESSURE: 87 MMHG

## 2022-06-29 DIAGNOSIS — I87.8 VENOUS STASIS: ICD-10-CM

## 2022-06-29 DIAGNOSIS — L02.419 CELLULITIS AND ABSCESS OF LOWER EXTREMITY: Primary | ICD-10-CM

## 2022-06-29 DIAGNOSIS — L03.119 CELLULITIS AND ABSCESS OF LOWER EXTREMITY: Primary | ICD-10-CM

## 2022-06-29 PROCEDURE — 99214 OFFICE O/P EST MOD 30 MIN: CPT | Mod: S$GLB,,, | Performed by: FAMILY MEDICINE

## 2022-06-29 PROCEDURE — 99214 PR OFFICE/OUTPT VISIT, EST, LEVL IV, 30-39 MIN: ICD-10-PCS | Mod: S$GLB,,, | Performed by: FAMILY MEDICINE

## 2022-06-29 RX ORDER — CLINDAMYCIN HYDROCHLORIDE 300 MG/1
300 CAPSULE ORAL 3 TIMES DAILY
Qty: 30 CAPSULE | Refills: 0 | Status: SHIPPED | OUTPATIENT
Start: 2022-06-29 | End: 2022-07-09

## 2022-06-29 RX ORDER — MUPIROCIN 20 MG/G
OINTMENT TOPICAL
Qty: 22 G | Refills: 1 | Status: SHIPPED | OUTPATIENT
Start: 2022-06-29 | End: 2022-07-18 | Stop reason: SDUPTHER

## 2022-06-29 NOTE — PROGRESS NOTES
"Subjective:       Patient ID: Derik Wolfe is a 86 y.o. male.    Vitals:  height is 5' 11" (1.803 m) and weight is 113.9 kg (251 lb). His temperature is 98.7 °F (37.1 °C). His blood pressure is 145/87 (abnormal) and his pulse is 68. His respiration is 20 and oxygen saturation is 97%.     Chief Complaint: Abrasion    Pt presents with complaint of dog scratch on right lower leg.  Pt states incident happened about 2 weeks ago and says he has been taking amoxicillin for his symptoms.  Pt sttaes he noticed clear liquid coming from the affected area but no signs of discolored fluid.     Other  This is a new problem. The current episode started 1 to 4 weeks ago. The problem occurs rarely. The problem has been gradually improving. Nothing aggravates the symptoms. Treatments tried: amoxicillin. The treatment provided no relief.       Skin: Positive for abrasion and erythema.       Objective:      Physical Exam   Constitutional: He does not appear ill. No distress. obesity  HENT:   Head: Normocephalic and atraumatic.   Neck: Neck supple.   Cardiovascular: Normal rate, regular rhythm, normal heart sounds and normal pulses.   Pulmonary/Chest: Effort normal and breath sounds normal.   Abdominal: Normal appearance and bowel sounds are normal. Soft.   Musculoskeletal:         General: No swelling.      Right lower leg: Edema (thickened hypertrophied skin with circumferential erythema noted bilaterally) present.      Left lower leg: Edema present.   Neurological: He is alert.   Skin: erythema and lesion (abrasion right medial calf with mild surrounding erythema, no drainage noted, mildly tender)   Nursing note and vitals reviewed.        Assessment:       1. Cellulitis and abscess of lower extremity    2. Venous stasis          Plan:         Cellulitis and abscess of lower extremity  -     clindamycin (CLEOCIN) 300 MG capsule; Take 1 capsule (300 mg total) by mouth 3 (three) times daily. for 10 days  Dispense: 30 capsule; " Refill: 0  -     mupirocin (BACTROBAN) 2 % ointment; Apply to affected area 3 times daily  Dispense: 22 g; Refill: 1    Venous stasis    discussed compression stockings, elevation. Recommended early followup with PCP Dr Seals

## 2022-07-18 ENCOUNTER — OFFICE VISIT (OUTPATIENT)
Dept: INTERNAL MEDICINE | Facility: CLINIC | Age: 87
End: 2022-07-18
Payer: MEDICARE

## 2022-07-18 VITALS
OXYGEN SATURATION: 97 % | BODY MASS INDEX: 33.55 KG/M2 | HEART RATE: 68 BPM | WEIGHT: 239.63 LBS | SYSTOLIC BLOOD PRESSURE: 122 MMHG | DIASTOLIC BLOOD PRESSURE: 70 MMHG | HEIGHT: 71 IN

## 2022-07-18 DIAGNOSIS — L03.115 CELLULITIS OF RIGHT LOWER EXTREMITY: Primary | ICD-10-CM

## 2022-07-18 DIAGNOSIS — L98.9 SKIN LESION: ICD-10-CM

## 2022-07-18 DIAGNOSIS — L03.119 CELLULITIS AND ABSCESS OF LOWER EXTREMITY: ICD-10-CM

## 2022-07-18 DIAGNOSIS — I10 PRIMARY HYPERTENSION: ICD-10-CM

## 2022-07-18 DIAGNOSIS — H61.22 IMPACTED CERUMEN OF LEFT EAR: ICD-10-CM

## 2022-07-18 DIAGNOSIS — L02.419 CELLULITIS AND ABSCESS OF LOWER EXTREMITY: ICD-10-CM

## 2022-07-18 PROCEDURE — 99214 OFFICE O/P EST MOD 30 MIN: CPT | Mod: S$PBB,,, | Performed by: INTERNAL MEDICINE

## 2022-07-18 PROCEDURE — 99214 PR OFFICE/OUTPT VISIT, EST, LEVL IV, 30-39 MIN: ICD-10-PCS | Mod: S$PBB,,, | Performed by: INTERNAL MEDICINE

## 2022-07-18 PROCEDURE — 99999 PR PBB SHADOW E&M-EST. PATIENT-LVL IV: ICD-10-PCS | Mod: PBBFAC,,, | Performed by: INTERNAL MEDICINE

## 2022-07-18 PROCEDURE — 99999 PR PBB SHADOW E&M-EST. PATIENT-LVL IV: CPT | Mod: PBBFAC,,, | Performed by: INTERNAL MEDICINE

## 2022-07-18 PROCEDURE — 99214 OFFICE O/P EST MOD 30 MIN: CPT | Mod: PBBFAC | Performed by: INTERNAL MEDICINE

## 2022-07-18 RX ORDER — MUPIROCIN 20 MG/G
OINTMENT TOPICAL
Qty: 30 G | Refills: 1 | Status: SHIPPED | OUTPATIENT
Start: 2022-07-18

## 2022-07-18 NOTE — PROGRESS NOTES
Subjective:       Patient ID: Derik Wolfe is a 86 y.o. male.   Chief Complaint: Follow-up (Cellulitis) and Cerumen Impaction    Patient here for follow-up of right leg cellulitis.  He thinks it is much better.  He also wants me to take a look at his left ear.  Patient with history of venous insufficiency and some chronic skin irritation comes in for follow-up.  He took oral antibiotics and used topical antibiotic cream.  He still has some slight swelling bilaterally and some venous stasis changes but the cellulitis has clinically resolved.  We talked about the importance of elevating the feet, trying to keep the fluid down and general follow-up care  He expressed understanding.  We talked about compression socks or stockings.  He use some wax dissolving drops in the left ear but it still feels plugged.  Lastly he should have a follow-up Dermatology appointment since a year and a half ago he had a skin cancer on the arm but has not had a follow-up visit during the pandemic    Review of Systems   Constitutional: Negative for activity change and unexpected weight change.   HENT: Positive for hearing loss (left ear wax. ). Negative for rhinorrhea and trouble swallowing.    Eyes: Negative for discharge and visual disturbance.   Respiratory: Negative for chest tightness and wheezing.    Cardiovascular: Positive for leg swelling. Negative for chest pain and palpitations.   Gastrointestinal: Negative for blood in stool, constipation, diarrhea and vomiting.   Endocrine: Negative for polydipsia and polyuria.   Genitourinary: Negative for difficulty urinating, hematuria and urgency.   Musculoskeletal: Positive for joint swelling. Negative for arthralgias and neck pain.   Neurological: Negative for weakness and headaches.   Psychiatric/Behavioral: Negative for confusion and dysphoric mood.          Objective:      Physical Exam  Constitutional:       General: He is not in acute distress.     Appearance: He is  well-developed.   HENT:      Head: Normocephalic and atraumatic.      Right Ear: Tympanic membrane, ear canal and external ear normal.      Left Ear: External ear normal. There is impacted cerumen (I was able to remove a large amount and the patient said his hearing improved.  He will continue local care. ).      Mouth/Throat:      Pharynx: No oropharyngeal exudate or posterior oropharyngeal erythema.   Eyes:      General: No scleral icterus.     Conjunctiva/sclera: Conjunctivae normal.      Pupils: Pupils are equal, round, and reactive to light.   Neck:      Thyroid: No thyromegaly.      Comments: No supraclavicular nodes palpated  Cardiovascular:      Rate and Rhythm: Normal rate and regular rhythm.      Pulses: Normal pulses.      Heart sounds: Normal heart sounds. No murmur heard.  Pulmonary:      Effort: Pulmonary effort is normal.      Breath sounds: Normal breath sounds. No wheezing.   Abdominal:      General: Bowel sounds are normal.      Palpations: Abdomen is soft. There is no mass.      Tenderness: There is no abdominal tenderness.   Musculoskeletal:         General: No tenderness.      Cervical back: Normal range of motion and neck supple.      Right lower leg: Edema ( 1+ with venous stasis changes) present.      Left lower leg: Edema ( 1+ with venous stasis changes) present.   Lymphadenopathy:      Cervical: No cervical adenopathy.   Skin:     Coloration: Skin is not jaundiced or pale.   Neurological:      General: No focal deficit present.      Mental Status: He is alert and oriented to person, place, and time.   Psychiatric:         Mood and Affect: Mood normal.         Behavior: Behavior normal.         Assessment:       Problem List Items Addressed This Visit        Cardiac/Vascular    Hypertension      Other Visit Diagnoses     Cellulitis of right lower extremity    -  Primary    Cellulitis and abscess of lower extremity        Relevant Medications    mupirocin (BACTROBAN) 2 % ointment    Skin  "lesion        Relevant Orders    Ambulatory referral/consult to Dermatology    Impacted cerumen of left ear              Plan:       Derik was seen today for follow-up and cerumen impaction.    Diagnoses and all orders for this visit:    Cellulitis of right lower extremity    Primary hypertension    Cellulitis and abscess of lower extremity  -     mupirocin (BACTROBAN) 2 % ointment; Apply to affected area 2 times daily    Skin lesion  -     Ambulatory referral/consult to Dermatology; Future    Impacted cerumen of left ear           Cellulitis clinically resolved.  Continue to monitor      Portions of this note may have been created with voice recognition software. Occasional "wrong-word" or "sound-a-like" substitutions may have occurred due to the inherent limitations of voice recognition software. Please, read the note carefully and recognize, using context, where substitutions have occurred.  "

## 2022-07-19 ENCOUNTER — PATIENT MESSAGE (OUTPATIENT)
Dept: INTERNAL MEDICINE | Facility: CLINIC | Age: 87
End: 2022-07-19
Payer: MEDICARE

## 2022-07-31 ENCOUNTER — PATIENT MESSAGE (OUTPATIENT)
Dept: INTERNAL MEDICINE | Facility: CLINIC | Age: 87
End: 2022-07-31
Payer: MEDICARE

## 2022-12-01 ENCOUNTER — OFFICE VISIT (OUTPATIENT)
Dept: URGENT CARE | Facility: CLINIC | Age: 87
End: 2022-12-01
Payer: MEDICARE

## 2022-12-01 VITALS
WEIGHT: 227 LBS | OXYGEN SATURATION: 96 % | DIASTOLIC BLOOD PRESSURE: 82 MMHG | SYSTOLIC BLOOD PRESSURE: 166 MMHG | RESPIRATION RATE: 16 BRPM | BODY MASS INDEX: 31.66 KG/M2 | HEART RATE: 75 BPM | TEMPERATURE: 97 F

## 2022-12-01 DIAGNOSIS — J20.8 ACUTE VIRAL BRONCHITIS: Primary | ICD-10-CM

## 2022-12-01 LAB
CTP QC/QA: YES
SARS-COV-2 AG RESP QL IA.RAPID: NEGATIVE

## 2022-12-01 PROCEDURE — 87811 SARS CORONAVIRUS 2 ANTIGEN POCT, MANUAL READ: ICD-10-PCS | Mod: QW,CR,S$GLB, | Performed by: FAMILY MEDICINE

## 2022-12-01 PROCEDURE — 87811 SARS-COV-2 COVID19 W/OPTIC: CPT | Mod: QW,CR,S$GLB, | Performed by: FAMILY MEDICINE

## 2022-12-01 PROCEDURE — 99214 PR OFFICE/OUTPT VISIT, EST, LEVL IV, 30-39 MIN: ICD-10-PCS | Mod: S$GLB,,, | Performed by: FAMILY MEDICINE

## 2022-12-01 PROCEDURE — 99214 OFFICE O/P EST MOD 30 MIN: CPT | Mod: S$GLB,,, | Performed by: FAMILY MEDICINE

## 2022-12-01 RX ORDER — BENZONATATE 100 MG/1
100 CAPSULE ORAL EVERY 6 HOURS PRN
Qty: 30 CAPSULE | Refills: 1 | Status: SHIPPED | OUTPATIENT
Start: 2022-12-01 | End: 2023-12-01

## 2022-12-01 RX ORDER — PROMETHAZINE HYDROCHLORIDE AND DEXTROMETHORPHAN HYDROBROMIDE 6.25; 15 MG/5ML; MG/5ML
5 SYRUP ORAL NIGHTLY PRN
Qty: 118 ML | Refills: 0 | Status: SHIPPED | OUTPATIENT
Start: 2022-12-01 | End: 2022-12-11

## 2022-12-01 RX ORDER — ALBUTEROL SULFATE 90 UG/1
2 AEROSOL, METERED RESPIRATORY (INHALATION) EVERY 6 HOURS PRN
Qty: 6.7 G | Refills: 0 | Status: SHIPPED | OUTPATIENT
Start: 2022-12-01

## 2022-12-01 NOTE — PROGRESS NOTES
Subjective:       Patient ID: Derik Wolfe is a 87 y.o. male.    Vitals:  weight is 103 kg (227 lb). His oral temperature is 97.2 °F (36.2 °C). His blood pressure is 166/82 (abnormal) and his pulse is 75. His respiration is 16 and oxygen saturation is 96%.     Chief Complaint: Cough    This is a 87 y.o. male who presents today with a chief complaint of productive cough (phlegm) x 2 weeks. Pt has post nasal drip, but no nasal congestion, no runny nose, no fever, no sinus pressure, no H/A, no sore throat.    Home Tx: Mucinex - relief, ceprocol lozenge     PMH: deviated septum, R nostril always congested    Cough  Pertinent negatives include no chest pain or ear congestion.     Cardiovascular:  Negative for chest pain.   Respiratory:  Positive for cough.      Objective:      Physical Exam   Constitutional: He does not appear ill. No distress. obesity  HENT:   Head: Normocephalic and atraumatic.   Nose: Rhinorrhea and congestion present.   Mouth/Throat: Mucous membranes are moist. Posterior oropharyngeal erythema present.   Eyes: Pupils are equal, round, and reactive to light. Extraocular movement intact   Neck: Neck supple.   Cardiovascular: Normal rate, regular rhythm, normal heart sounds and normal pulses.   Pulmonary/Chest: Effort normal. He has wheezes (scant wheezes).   Abdominal: Normal appearance. Soft.   Neurological: He is alert.   Nursing note and vitals reviewed.      Results for orders placed or performed in visit on 12/01/22   SARS Coronavirus 2 Antigen, POCT Manual Read   Result Value Ref Range    SARS Coronavirus 2 Antigen Negative Negative     Acceptable Yes       Assessment:       1. Acute viral bronchitis          Plan:         Acute viral bronchitis  -     SARS Coronavirus 2 Antigen, POCT Manual Read  -     benzonatate (TESSALON PERLES) 100 MG capsule; Take 1 capsule (100 mg total) by mouth every 6 (six) hours as needed for Cough.  Dispense: 30 capsule; Refill: 1  -      promethazine-dextromethorphan (PROMETHAZINE-DM) 6.25-15 mg/5 mL Syrp; Take 5 mLs by mouth nightly as needed (cough).  Dispense: 118 mL; Refill: 0  -     albuterol (PROAIR HFA) 90 mcg/actuation inhaler; Inhale 2 puffs into the lungs every 6 (six) hours as needed for Wheezing. Rescue  Dispense: 6.7 g; Refill: 0

## 2023-02-07 RX ORDER — ATENOLOL 25 MG/1
TABLET ORAL
Qty: 90 TABLET | Refills: 0 | OUTPATIENT
Start: 2023-02-07

## 2023-02-07 RX ORDER — ATENOLOL 25 MG/1
25 TABLET ORAL DAILY
Qty: 90 TABLET | Refills: 3 | Status: SHIPPED | OUTPATIENT
Start: 2023-02-07 | End: 2023-10-30 | Stop reason: SDUPTHER

## 2023-02-07 NOTE — TELEPHONE ENCOUNTER
Refill Decision Note   Derik Wolfe  is requesting a refill authorization.  Brief Assessment and Rationale for Refill:  Quick Discontinue     Medication Therapy Plan:  Refill request addressed in previous encounter 02/07/23    Medication Reconciliation Completed: No   Comments:     No Care Gaps recommended.     Note composed:9:17 AM 02/07/2023

## 2023-02-07 NOTE — TELEPHONE ENCOUNTER
No new care gaps identified.  [facility] Embedded Care Gaps. Reference number: [id number]. 2/06/2023   7:51:08 PM CST

## 2023-02-07 NOTE — TELEPHONE ENCOUNTER
No new care gaps identified.  [facility] Embedded Care Gaps. Reference number: [id number]. 2/06/2023   7:54:10 PM CST

## 2023-02-07 NOTE — TELEPHONE ENCOUNTER
Refill Routing Note   Medication(s) are not appropriate for processing by [facility] for the following reason(s):       Required vitals abnormal    [facility] action(s):  Defer                   Appointments  past 12m or future 3m with PCP    Date Provider   Last Visit   7/18/2022 Girish Seals MD   Next Visit   Visit date not found Girish Seals MD   ED visits in past 90 days: 0        Note composed:9:16 AM 02/07/2023

## 2023-03-13 ENCOUNTER — TELEPHONE (OUTPATIENT)
Dept: INTERNAL MEDICINE | Facility: CLINIC | Age: 88
End: 2023-03-13
Payer: MEDICARE

## 2023-03-13 NOTE — TELEPHONE ENCOUNTER
----- Message from Freedom Garcia sent at 3/13/2023  9:21 AM CDT -----  Contact: self [phone]  Pt requesting a call.    Please call and advise

## 2023-06-27 ENCOUNTER — PATIENT MESSAGE (OUTPATIENT)
Dept: INTERNAL MEDICINE | Facility: CLINIC | Age: 88
End: 2023-06-27
Payer: MEDICARE

## 2023-06-27 DIAGNOSIS — I87.2 STASIS DERMATITIS OF BOTH LEGS: ICD-10-CM

## 2023-06-28 RX ORDER — TRIAMCINOLONE ACETONIDE 1 MG/G
OINTMENT TOPICAL
Qty: 453.6 G | Refills: 0 | Status: SHIPPED | OUTPATIENT
Start: 2023-06-28

## 2023-06-28 NOTE — TELEPHONE ENCOUNTER
No care due was identified.  [facility] Embedded Care Due Messages. Reference number: [id number].   6/28/2023 8:40:22 AM CDT

## 2023-08-04 ENCOUNTER — PATIENT OUTREACH (OUTPATIENT)
Dept: ADMINISTRATIVE | Facility: HOSPITAL | Age: 88
End: 2023-08-04
Payer: MEDICARE

## 2023-08-04 ENCOUNTER — PATIENT MESSAGE (OUTPATIENT)
Dept: ADMINISTRATIVE | Facility: HOSPITAL | Age: 88
End: 2023-08-04
Payer: MEDICARE

## 2023-08-10 ENCOUNTER — OFFICE VISIT (OUTPATIENT)
Dept: INTERNAL MEDICINE | Facility: CLINIC | Age: 88
End: 2023-08-10
Payer: MEDICARE

## 2023-08-10 ENCOUNTER — LAB VISIT (OUTPATIENT)
Dept: LAB | Facility: HOSPITAL | Age: 88
End: 2023-08-10
Attending: INTERNAL MEDICINE
Payer: MEDICARE

## 2023-08-10 VITALS
SYSTOLIC BLOOD PRESSURE: 130 MMHG | DIASTOLIC BLOOD PRESSURE: 70 MMHG | HEART RATE: 73 BPM | BODY MASS INDEX: 32.5 KG/M2 | HEIGHT: 71 IN | OXYGEN SATURATION: 97 % | WEIGHT: 232.13 LBS

## 2023-08-10 DIAGNOSIS — R73.09 ELEVATED GLUCOSE LEVEL: ICD-10-CM

## 2023-08-10 DIAGNOSIS — I10 ESSENTIAL HYPERTENSION: ICD-10-CM

## 2023-08-10 DIAGNOSIS — I10 PRIMARY HYPERTENSION: ICD-10-CM

## 2023-08-10 DIAGNOSIS — M10.9 GOUT, UNSPECIFIED CAUSE, UNSPECIFIED CHRONICITY, UNSPECIFIED SITE: ICD-10-CM

## 2023-08-10 DIAGNOSIS — M17.11 ARTHRITIS OF RIGHT KNEE: ICD-10-CM

## 2023-08-10 DIAGNOSIS — I10 PRIMARY HYPERTENSION: Primary | ICD-10-CM

## 2023-08-10 PROBLEM — E66.01 SEVERE OBESITY (BMI 35.0-39.9) WITH COMORBIDITY: Status: RESOLVED | Noted: 2022-01-07 | Resolved: 2023-08-10

## 2023-08-10 LAB
ALBUMIN SERPL BCP-MCNC: 3.9 G/DL (ref 3.5–5.2)
ALP SERPL-CCNC: 66 U/L (ref 55–135)
ALT SERPL W/O P-5'-P-CCNC: 25 U/L (ref 10–44)
ANION GAP SERPL CALC-SCNC: 8 MMOL/L (ref 8–16)
AST SERPL-CCNC: 27 U/L (ref 10–40)
BILIRUB SERPL-MCNC: 1.3 MG/DL (ref 0.1–1)
BUN SERPL-MCNC: 11 MG/DL (ref 8–23)
CALCIUM SERPL-MCNC: 10.4 MG/DL (ref 8.7–10.5)
CHLORIDE SERPL-SCNC: 104 MMOL/L (ref 95–110)
CHOLEST SERPL-MCNC: 152 MG/DL (ref 120–199)
CHOLEST/HDLC SERPL: 3.5 {RATIO} (ref 2–5)
CO2 SERPL-SCNC: 30 MMOL/L (ref 23–29)
CREAT SERPL-MCNC: 0.8 MG/DL (ref 0.5–1.4)
EST. GFR  (NO RACE VARIABLE): >60 ML/MIN/1.73 M^2
ESTIMATED AVG GLUCOSE: 103 MG/DL (ref 68–131)
GLUCOSE SERPL-MCNC: 101 MG/DL (ref 70–110)
HBA1C MFR BLD: 5.2 % (ref 4–5.6)
HDLC SERPL-MCNC: 43 MG/DL (ref 40–75)
HDLC SERPL: 28.3 % (ref 20–50)
LDLC SERPL CALC-MCNC: 95.8 MG/DL (ref 63–159)
NONHDLC SERPL-MCNC: 109 MG/DL
POTASSIUM SERPL-SCNC: 4.4 MMOL/L (ref 3.5–5.1)
PROT SERPL-MCNC: 7.2 G/DL (ref 6–8.4)
SODIUM SERPL-SCNC: 142 MMOL/L (ref 136–145)
TRIGL SERPL-MCNC: 66 MG/DL (ref 30–150)
URATE SERPL-MCNC: 5 MG/DL (ref 3.4–7)

## 2023-08-10 PROCEDURE — 80061 LIPID PANEL: CPT | Performed by: INTERNAL MEDICINE

## 2023-08-10 PROCEDURE — 83036 HEMOGLOBIN GLYCOSYLATED A1C: CPT | Performed by: INTERNAL MEDICINE

## 2023-08-10 PROCEDURE — 99214 OFFICE O/P EST MOD 30 MIN: CPT | Mod: S$PBB,,, | Performed by: INTERNAL MEDICINE

## 2023-08-10 PROCEDURE — 80053 COMPREHEN METABOLIC PANEL: CPT | Performed by: INTERNAL MEDICINE

## 2023-08-10 PROCEDURE — 99999 PR PBB SHADOW E&M-EST. PATIENT-LVL IV: ICD-10-PCS | Mod: PBBFAC,,, | Performed by: INTERNAL MEDICINE

## 2023-08-10 PROCEDURE — 99214 PR OFFICE/OUTPT VISIT, EST, LEVL IV, 30-39 MIN: ICD-10-PCS | Mod: S$PBB,,, | Performed by: INTERNAL MEDICINE

## 2023-08-10 PROCEDURE — 99999 PR PBB SHADOW E&M-EST. PATIENT-LVL IV: CPT | Mod: PBBFAC,,, | Performed by: INTERNAL MEDICINE

## 2023-08-10 PROCEDURE — 99214 OFFICE O/P EST MOD 30 MIN: CPT | Mod: PBBFAC | Performed by: INTERNAL MEDICINE

## 2023-08-10 PROCEDURE — 36415 COLL VENOUS BLD VENIPUNCTURE: CPT | Performed by: INTERNAL MEDICINE

## 2023-08-10 PROCEDURE — 84550 ASSAY OF BLOOD/URIC ACID: CPT | Performed by: INTERNAL MEDICINE

## 2023-08-10 NOTE — PROGRESS NOTES
Subjective:       Patient ID: Derik Wolfe is a 87 y.o. male.    Chief Complaint: Annual Exam    For annual follow-up of medical problems including knee arthritis, hypertension, gout, borderline glucose, obesity.  Overall doing okay, still grieving the death of his daughter.  He is helping out with his grandson who has some mild cognitive disabilities.  Overall that is going okay.  We would like to update some labs.  He says his prescriptions are up-to-date.  We talked about flu and COVID vaccines in the coming few weeks.  He had injections in the knee in the past and did not find that it helped very much so he has decided not to pursue that at this time and is basically wearing a brace.      Review of Systems   Constitutional:  Negative for activity change and unexpected weight change.   HENT:  Negative for hearing loss, rhinorrhea and trouble swallowing.    Eyes:  Negative for discharge and visual disturbance.   Respiratory:  Negative for chest tightness and wheezing.    Cardiovascular:  Negative for chest pain and palpitations.   Gastrointestinal:  Negative for blood in stool, constipation, diarrhea and vomiting.   Endocrine: Negative for polydipsia and polyuria.   Genitourinary:  Negative for difficulty urinating, hematuria and urgency.   Musculoskeletal:  Positive for arthralgias and gait problem (knee). Negative for joint swelling and neck pain.   Neurological:  Negative for weakness and headaches.   Psychiatric/Behavioral:  Negative for confusion and dysphoric mood.            Past Medical History:   Diagnosis Date    AK (actinic keratosis)     arms and forehead treated with efudex    Calcium oxalate renal stones     Gout flare     Hypertension     Obesity     SCC (squamous cell carcinoma) excised 8/10/18    R upper back    Squamous cell carcinoma 2014    right upper nose     Past Surgical History:   Procedure Laterality Date    KNEE ARTHROSCOPY        Patient Active Problem List   Diagnosis     Hypertension        Objective:      Physical Exam  Constitutional:       General: He is not in acute distress.     Appearance: He is well-developed. He is obese.   HENT:      Head: Normocephalic and atraumatic.      Right Ear: Tympanic membrane, ear canal and external ear normal.      Left Ear: Tympanic membrane, ear canal and external ear normal.      Mouth/Throat:      Pharynx: No oropharyngeal exudate or posterior oropharyngeal erythema.   Eyes:      General: No scleral icterus.     Conjunctiva/sclera: Conjunctivae normal.      Pupils: Pupils are equal, round, and reactive to light.   Neck:      Thyroid: No thyromegaly.      Comments: No supraclavicular nodes palpated  Cardiovascular:      Rate and Rhythm: Normal rate and regular rhythm.      Pulses: Normal pulses.      Heart sounds: Normal heart sounds. No murmur heard.  Pulmonary:      Effort: Pulmonary effort is normal.      Breath sounds: Normal breath sounds. No wheezing.   Abdominal:      General: Bowel sounds are normal.      Palpations: Abdomen is soft. There is no mass.      Tenderness: There is no abdominal tenderness.   Musculoskeletal:         General: Tenderness (right knee) present.      Cervical back: Normal range of motion and neck supple.      Right lower leg: No edema.      Left lower leg: No edema.   Lymphadenopathy:      Cervical: No cervical adenopathy.   Skin:     Coloration: Skin is not jaundiced or pale.   Neurological:      General: No focal deficit present.      Mental Status: He is alert and oriented to person, place, and time.   Psychiatric:         Mood and Affect: Mood normal.         Behavior: Behavior normal.         Assessment:       Problem List Items Addressed This Visit          Cardiac/Vascular    Hypertension - Primary    Relevant Orders    Lipid Panel    Uric Acid    Comprehensive Metabolic Panel    Hemoglobin A1C     Other Visit Diagnoses       Essential hypertension        Relevant Orders    Lipid Panel    Uric Acid     "Comprehensive Metabolic Panel    Hemoglobin A1C    Gout, unspecified cause, unspecified chronicity, unspecified site        Relevant Orders    Lipid Panel    Uric Acid    Comprehensive Metabolic Panel    Hemoglobin A1C    Elevated glucose level        Relevant Orders    Lipid Panel    Uric Acid    Comprehensive Metabolic Panel    Hemoglobin A1C    Arthritis of right knee                Plan:         Derik was seen today for annual exam.    Diagnoses and all orders for this visit:    Primary hypertension  -     Lipid Panel; Future  -     Uric Acid; Future  -     Comprehensive Metabolic Panel; Future  -     Hemoglobin A1C; Future    Essential hypertension  -     Lipid Panel; Future  -     Uric Acid; Future  -     Comprehensive Metabolic Panel; Future  -     Hemoglobin A1C; Future    Gout, unspecified cause, unspecified chronicity, unspecified site  -     Lipid Panel; Future  -     Uric Acid; Future  -     Comprehensive Metabolic Panel; Future  -     Hemoglobin A1C; Future    Elevated glucose level  -     Lipid Panel; Future  -     Uric Acid; Future  -     Comprehensive Metabolic Panel; Future  -     Hemoglobin A1C; Future    Arthritis of right knee           Review [id number] in 6 months sooner p.r.n.          Portions of this note may have been created with voice recognition software. Occasional "wrong-word" or "sound-a-like" substitutions may have occurred due to the inherent limitations of voice recognition software. Please, read the note carefully and recognize, using context, where substitutions have occurred.  "

## 2023-08-25 ENCOUNTER — PATIENT MESSAGE (OUTPATIENT)
Dept: INTERNAL MEDICINE | Facility: CLINIC | Age: 88
End: 2023-08-25
Payer: MEDICARE

## 2023-08-25 NOTE — TELEPHONE ENCOUNTER
Pt looking for recommendation to a dermatologist at main [facility]     Do we have any specific dermatologist we recommend?

## 2023-08-31 ENCOUNTER — TELEPHONE (OUTPATIENT)
Dept: DERMATOLOGY | Facility: CLINIC | Age: 88
End: 2023-08-31
Payer: MEDICARE

## 2023-08-31 NOTE — TELEPHONE ENCOUNTER
I left a message stating the purpose of my call is to assist with scheduling an appointment with Dr. Toro.  I mentioned on his message that he listed [facility] location, but I informed him she works at the [facility] location.  I suggested sending us a message if he would like to be scheduled with Dr. Hager or book another appointment with a different provider.

## 2023-09-13 ENCOUNTER — TELEPHONE (OUTPATIENT)
Dept: DERMATOLOGY | Facility: CLINIC | Age: 88
End: 2023-09-13
Payer: MEDICARE

## 2023-09-13 NOTE — TELEPHONE ENCOUNTER
----- Message from Mirtha Conde LPN sent at 8/29/2023  5:02 PM CDT -----    ----- Message -----  From: Kerry Hearn  Sent: 8/29/2023  12:37 PM CDT  To: [facility] Staff    Type:  Appointment Request    Name of Caller:LORENZO VERAS [[id number]]  When is the first available appointment?No access  Symptoms:information  Would the patient rather a call back or a response via [facility]? Call back  Best Call Back Number:[phone]  Additional Information: Patient indicates he was previously with a different provider who is no longer at [facility] and would like to talk to someone about scheduling an appointment with different provider. PCP noticed something and would like for it to get checked out. Patient indicates he would like to make an appointment for both him and his wife Luisa Veras MRN: [id number]. Patient would like a call back regarding an appointment for both him and his wife on the same day. Patient indicates he has questions in regards to the department. Please call back with further assistance and more information.

## 2023-10-30 DIAGNOSIS — M10.9 GOUT, UNSPECIFIED CAUSE, UNSPECIFIED CHRONICITY, UNSPECIFIED SITE: ICD-10-CM

## 2023-10-30 RX ORDER — ALLOPURINOL 300 MG/1
300 TABLET ORAL DAILY
Qty: 90 TABLET | Refills: 3 | Status: SHIPPED | OUTPATIENT
Start: 2023-10-30

## 2023-10-30 RX ORDER — ATENOLOL 25 MG/1
25 TABLET ORAL DAILY
Qty: 90 TABLET | Refills: 3 | Status: SHIPPED | OUTPATIENT
Start: 2023-10-30

## 2023-10-30 NOTE — TELEPHONE ENCOUNTER
Refill Routing Note   Medication(s) are not appropriate for processing by [facility] for the following reason(s):      Required labs outdated    [facility] action(s):  Approve  Defer Care Due:  Labs due            Appointments  past 12m or future 3m with PCP    Date Provider   Last Visit   8/10/2023 Girish Seals MD   Next Visit   Visit date not found Girish Seals MD   ED visits in past 90 days: 0        Note composed:3:22 PM 10/30/2023

## 2023-10-30 NOTE — TELEPHONE ENCOUNTER
Care Due:                  Date            Visit Type   Department     Provider  --------------------------------------------------------------------------------                                MYCHART                              ANNUAL                              CHECKUP/PHY  [facility] INTERNAL  Last Visit: 08-      [facility]       Girish Seals  Next Visit: None Scheduled  None         None Found                                                            Last  Test          Frequency    Reason                     Performed    Due Date  --------------------------------------------------------------------------------    CBC.........  12 months..  allopurinoL..............  Not Found    Overdue    [employer] Embedded Care Due Messages. Reference number: [id number].   10/30/2023 11:37:41 AM CDT

## 2023-11-20 ENCOUNTER — OFFICE VISIT (OUTPATIENT)
Dept: DERMATOLOGY | Facility: CLINIC | Age: 88
End: 2023-11-20
Payer: MEDICARE

## 2023-11-20 DIAGNOSIS — L21.9 SEBORRHEIC DERMATITIS: ICD-10-CM

## 2023-11-20 DIAGNOSIS — Z85.828 HISTORY OF NONMELANOMA SKIN CANCER: ICD-10-CM

## 2023-11-20 DIAGNOSIS — L57.0 AK (ACTINIC KERATOSIS): Primary | ICD-10-CM

## 2023-11-20 DIAGNOSIS — L82.1 SEBORRHEIC KERATOSES: ICD-10-CM

## 2023-11-20 PROCEDURE — 17004 PR DESTRUCTION, PREMALIGNANT LESIONS; 15 OR MORE LESIONS: ICD-10-PCS | Mod: S$PBB,,, | Performed by: STUDENT IN AN ORGANIZED HEALTH CARE EDUCATION/TRAINING PROGRAM

## 2023-11-20 PROCEDURE — 99999 PR PBB SHADOW E&M-EST. PATIENT-LVL II: CPT | Mod: PBBFAC,,, | Performed by: STUDENT IN AN ORGANIZED HEALTH CARE EDUCATION/TRAINING PROGRAM

## 2023-11-20 PROCEDURE — 99203 OFFICE O/P NEW LOW 30 MIN: CPT | Mod: 25,S$PBB,, | Performed by: STUDENT IN AN ORGANIZED HEALTH CARE EDUCATION/TRAINING PROGRAM

## 2023-11-20 PROCEDURE — 99212 OFFICE O/P EST SF 10 MIN: CPT | Mod: PBBFAC,25 | Performed by: STUDENT IN AN ORGANIZED HEALTH CARE EDUCATION/TRAINING PROGRAM

## 2023-11-20 PROCEDURE — 99999 PR PBB SHADOW E&M-EST. PATIENT-LVL II: ICD-10-PCS | Mod: PBBFAC,,, | Performed by: STUDENT IN AN ORGANIZED HEALTH CARE EDUCATION/TRAINING PROGRAM

## 2023-11-20 PROCEDURE — 17004 DESTROY PREMAL LESIONS 15/>: CPT | Mod: S$PBB,,, | Performed by: STUDENT IN AN ORGANIZED HEALTH CARE EDUCATION/TRAINING PROGRAM

## 2023-11-20 PROCEDURE — 99203 PR OFFICE/OUTPT VISIT, NEW, LEVL III, 30-44 MIN: ICD-10-PCS | Mod: 25,S$PBB,, | Performed by: STUDENT IN AN ORGANIZED HEALTH CARE EDUCATION/TRAINING PROGRAM

## 2023-11-20 PROCEDURE — 17004 DESTROY PREMAL LESIONS 15/>: CPT | Mod: PBBFAC | Performed by: STUDENT IN AN ORGANIZED HEALTH CARE EDUCATION/TRAINING PROGRAM

## 2023-11-20 RX ORDER — MULTIVITAMIN
TABLET ORAL
COMMUNITY

## 2023-11-20 RX ORDER — SODIUM FLUORIDE/POTASSIUM NIT 1.1 %-5 %
PASTE (ML) DENTAL
COMMUNITY
Start: 2023-11-16

## 2023-11-20 NOTE — PROGRESS NOTES
Subjective:      Patient ID:  Derik Wolfe is a 88 y.o. male who presents for   Chief Complaint   Patient presents with    Skin Check     Derik Wolfe is a 88 y.o. male who presents for: FBSE screening exam for skin cancer.    Last office visit 9/30/20 with Dr. Hager for skin check.     The patient has the following lesions of concern:  Location: spot on L forehead  Duration: years  Symptoms: n/a  Relieving factors/Previous treatments: n/a    Patient with new complaint of lesion(s)  Location: spot on top of scalp  Duration: less than 1 year  Symptoms: scabs up, then scab seems to come off   Relieving factors/Previous treatments: n/a    Patient with new complaint of lesion(s)  Location: bump behind R ear  Duration: 2-3 years  Symptoms: n/a  Relieving factors/Previous treatments: n/a     Pt also reports some itching behind both ears. States triamcinolone seems to help.        Pertinent history:  History of blistering sunburns: No  History of tanning bed use: No  Family history of melanoma: Possibly, mother had skin cancer on her hands  Personal history of mole removal: Yes  Personal history of skin cancer: Yes    SCC excised from L upper arm by Dr. Hager on 10/23/20  SCC excised from R upper back in August 2018.  SCC excised from R upper nose by Dr. Jones on 6/2014.         Review of Systems   Skin:  Positive for activity-related sunscreen use. Negative for daily sunscreen use and recent sunburn.   Hematologic/Lymphatic: Does not bruise/bleed easily.       Objective:   Physical Exam   Constitutional: He appears well-developed and well-nourished.   Skin:                                Diagram Legend     Erythematous scaling macule/papule c/w actinic keratosis       Vascular papule c/w angioma      Pigmented verrucoid papule/plaque c/w seborrheic keratosis      Yellow umbilicated papule c/w sebaceous hyperplasia      Irregularly shaped tan macule c/w lentigo     1-2 mm smooth white papules consistent  with Milia      Movable subcutaneous cyst with punctum c/w epidermal inclusion cyst      Subcutaneous movable cyst c/w pilar cyst      Firm pink to brown papule c/w dermatofibroma      Pedunculated fleshy papule(s) c/w skin tag(s)      Evenly pigmented macule c/w junctional nevus     Mildly variegated pigmented, slightly irregular-bordered macule c/w mildly atypical nevus      Flesh colored to evenly pigmented papule c/w intradermal nevus       Pink pearly papule/plaque c/w basal cell carcinoma      Erythematous hyperkeratotic cursted plaque c/w SCC      Surgical scar with no sign of skin cancer recurrence      Open and closed comedones      Inflammatory papules and pustules      Verrucoid papule consistent consistent with wart     Erythematous eczematous patches and plaques     Dystrophic onycholytic nail with subungual debris c/w onychomycosis     Umbilicated papule    Erythematous-base heme-crusted tan verrucoid plaque consistent with inflamed seborrheic keratosis     Erythematous Silvery Scaling Plaque c/w Psoriasis     See annotation      Assessment / Plan:        AK (actinic keratosis)  Cryosurgery Procedure Note    Verbal consent from the patient is obtained including, but not limited to, risk of hypopigmentation/hyperpigmentation, scar, recurrence of lesion. The patient is aware of the precancerous quality and need for treatment of these lesions. Liquid nitrogen cryosurgery is applied to the 21 actinic keratoses, as detailed in the physical exam, to produce a freeze injury. The patient is aware that blisters may form and is instructed on wound care with gentle cleansing and use of vaseline ointment to keep moist until healed.     Discussed field therapy, he has not liked efudex in the past does not want to do again.  Recommend PDT to dorsal forearms/hands, sent message to Sarah to schedule  Will consider face if tolerates arms/hands future visits    Seborrheic keratoses  These are benign inherited growths  without a malignant potential. Reassurance given to patient. No treatment is necessary.     Seborrheic dermatitis  Flaking retroauricular ears  Reassurance  Cont topical steroid prn    History of nonmelanoma skin cancer  Examined areas of prior scars, no evidence of recurrence  - Continue skin exams at least annually, increased risk of additional skin cancers developing in the future  - I discussed the importance of sun protection with the patient. Recommend daily use of SPF 30+ physical or mineral sunscreen, apply 15 minutes before going outdoors and reapply every 2 hours. Discussed wide-brimmed hats and UPF 50+ clothing.    RTC 6 months, sooner prn

## 2024-01-12 ENCOUNTER — PATIENT MESSAGE (OUTPATIENT)
Dept: DERMATOLOGY | Facility: CLINIC | Age: 89
End: 2024-01-12
Payer: MEDICARE

## 2024-03-25 ENCOUNTER — OFFICE VISIT (OUTPATIENT)
Dept: DERMATOLOGY | Facility: CLINIC | Age: 89
End: 2024-03-25
Payer: MEDICARE

## 2024-03-25 DIAGNOSIS — L82.1 SEBORRHEIC KERATOSES: ICD-10-CM

## 2024-03-25 DIAGNOSIS — L57.0 AK (ACTINIC KERATOSIS): Primary | ICD-10-CM

## 2024-03-25 DIAGNOSIS — Z85.828 HISTORY OF NONMELANOMA SKIN CANCER: ICD-10-CM

## 2024-03-25 PROCEDURE — 99213 OFFICE O/P EST LOW 20 MIN: CPT | Mod: 25,S$PBB,, | Performed by: STUDENT IN AN ORGANIZED HEALTH CARE EDUCATION/TRAINING PROGRAM

## 2024-03-25 PROCEDURE — 99999 PR PBB SHADOW E&M-EST. PATIENT-LVL III: CPT | Mod: PBBFAC,,, | Performed by: STUDENT IN AN ORGANIZED HEALTH CARE EDUCATION/TRAINING PROGRAM

## 2024-03-25 PROCEDURE — 17004 DESTROY PREMAL LESIONS 15/>: CPT | Mod: PBBFAC | Performed by: STUDENT IN AN ORGANIZED HEALTH CARE EDUCATION/TRAINING PROGRAM

## 2024-03-25 PROCEDURE — 99213 OFFICE O/P EST LOW 20 MIN: CPT | Mod: PBBFAC,25 | Performed by: STUDENT IN AN ORGANIZED HEALTH CARE EDUCATION/TRAINING PROGRAM

## 2024-03-25 PROCEDURE — 17004 DESTROY PREMAL LESIONS 15/>: CPT | Mod: S$PBB,,, | Performed by: STUDENT IN AN ORGANIZED HEALTH CARE EDUCATION/TRAINING PROGRAM

## 2024-03-25 NOTE — PROGRESS NOTES
Subjective:      Patient ID:  Derik Wolfe is a 88 y.o. male who presents for   Chief Complaint   Patient presents with    Lesion    Skin Check     UBSE     Derik Wolfe is a 88 y.o. male who presents for: UBSE screening exam for skin cancer.    Last office visit 11/20/23 with me for FBSE.     The patient has the following lesions of concern:  Location: spot on L eye  Duration: 6 weeks  Symptoms: feels precancerous  Relieving factors/Previous treatments:     Recheck some lesions on back. Pt did not do PDT    Pertinent history:  History of blistering sunburns: No  History of tanning bed use: No  Family history of melanoma: Possibly, mother had skin cancer on her hands  Personal history of mole removal: Yes  Personal history of skin cancer: Yes    SCC excised from L upper arm by Dr. Hager on 10/23/20  SCC excised from R upper back in August 2018.  SCC excised from R upper nose by Dr. Jones on 6/2014.         Review of Systems   Skin:  Positive for activity-related sunscreen use. Negative for daily sunscreen use and recent sunburn.   Hematologic/Lymphatic: Does not bruise/bleed easily.       Objective:   Physical Exam   Constitutional: He appears well-developed and well-nourished.   Skin:   Areas Examined (abnormalities noted in diagram):   Scalp / Hair Palpated and Inspected  Head / Face Inspection Performed  Neck Inspection Performed  Chest / Axilla Inspection Performed  Abdomen Inspection Performed  Back Inspection Performed  RUE Inspected  LUE Inspection Performed                 Diagram Legend     Erythematous scaling macule/papule c/w actinic keratosis       Vascular papule c/w angioma      Pigmented verrucoid papule/plaque c/w seborrheic keratosis      Yellow umbilicated papule c/w sebaceous hyperplasia      Irregularly shaped tan macule c/w lentigo     1-2 mm smooth white papules consistent with Milia      Movable subcutaneous cyst with punctum c/w epidermal inclusion cyst      Subcutaneous  movable cyst c/w pilar cyst      Firm pink to brown papule c/w dermatofibroma      Pedunculated fleshy papule(s) c/w skin tag(s)      Evenly pigmented macule c/w junctional nevus     Mildly variegated pigmented, slightly irregular-bordered macule c/w mildly atypical nevus      Flesh colored to evenly pigmented papule c/w intradermal nevus       Pink pearly papule/plaque c/w basal cell carcinoma      Erythematous hyperkeratotic cursted plaque c/w SCC      Surgical scar with no sign of skin cancer recurrence      Open and closed comedones      Inflammatory papules and pustules      Verrucoid papule consistent consistent with wart     Erythematous eczematous patches and plaques     Dystrophic onycholytic nail with subungual debris c/w onychomycosis     Umbilicated papule    Erythematous-base heme-crusted tan verrucoid plaque consistent with inflamed seborrheic keratosis     Erythematous Silvery Scaling Plaque c/w Psoriasis     See annotation      Assessment / Plan:        AK (actinic keratosis)  Cryosurgery Procedure Note    Verbal consent from the patient is obtained including, but not limited to, risk of hypopigmentation/hyperpigmentation, scar, recurrence of lesion. The patient is aware of the precancerous quality and need for treatment of these lesions. Liquid nitrogen cryosurgery is applied to the 20 actinic keratoses, as detailed in the physical exam, to produce a freeze injury. The patient is aware that blisters may form and is instructed on wound care with gentle cleansing and use of vaseline ointment to keep moist until healed. The patient is supplied a handout on cryosurgery and is instructed to call if lesions do not completely resolve.    Again recommend PDT to dorsal forearms/hands diffuse >15 actinic keratoses  Pt does not like efudex  Amara Smith LPN to schedule    Seborrheic keratoses  These are benign inherited growths without a malignant potential. Reassurance given to patient. No treatment is  necessary.     History of NMSC  Examined areas of prior scars, no evidence of recurrence  - Continue skin exams at least annually, increased risk of additional skin cancers developing in the future  - I discussed the importance of sun protection with the patient. Recommend daily use of SPF 30+ physical or mineral sunscreen, apply 15 minutes before going outdoors and reapply every 2 hours. Discussed wide-brimmed hats and UPF 50+ clothing.    RTC 3 months UBSE, sooner prn

## 2024-04-08 ENCOUNTER — CLINICAL SUPPORT (OUTPATIENT)
Dept: DERMATOLOGY | Facility: CLINIC | Age: 89
End: 2024-04-08
Payer: MEDICARE

## 2024-04-08 DIAGNOSIS — L57.0 AK (ACTINIC KERATOSIS): Primary | ICD-10-CM

## 2024-04-08 PROCEDURE — 96567 PDT DSTR PRMLG LES SKN: CPT | Mod: PBBFAC

## 2024-04-08 PROCEDURE — 99999PBSHW PR PBB SHADOW TECHNICAL ONLY FILED TO HB: Mod: PBBFAC,,,

## 2024-04-08 PROCEDURE — 99999 PR PBB SHADOW E&M-EST. PATIENT-LVL III: CPT | Mod: PBBFAC,,,

## 2024-04-08 PROCEDURE — 96372 THER/PROPH/DIAG INJ SC/IM: CPT | Mod: PBBFAC

## 2024-04-08 PROCEDURE — 99213 OFFICE O/P EST LOW 20 MIN: CPT | Mod: PBBFAC,25

## 2024-04-08 NOTE — PROGRESS NOTES
Photodynamic Therapy Note.     PDT ordered per Dr. Yates     Patient here today for treatment of actinic keratoses using photodynamic therapy.  Risks including but not limited to burning, stinging, redness, swelling, crusting or blistering of the skin of the area treated were discussed with patient.  Patient elects to proceed with photodynamic therapy.     Treatment area:  Dorsal forearms and hands  Treatment area cleaned with rubbing alcohol, 2 Levulan Kerastick (NDC: [id number]) applied evenly to entire surface and allowed to absorb for 60 minutes.  Patient then placed under Bradly-U light for 16 minutes 40 seconds.     Patient tolerated treatment well with only mild but tolerable symptoms of discomfort.  Area washed gently with mild soap and water; Zinc oxide sunscreen applied.  Patient advised to avoid any significant light exposure (sun and artificial) for next 48 hours.     RTC:  In 1 month or sooner if any problems arise.

## 2024-04-08 NOTE — PATIENT INSTRUCTIONS
PHOTODYNAMIC THERAPY POST-OP INSTRUCTIONS      Great Job! You have finished your Bradly-Light treatment. The treatment you have completed will destroy precancerous cells in the skin and reduce your risk of developing skin cancer in the future.    What to expect when you leave the office:  You may notice some redness and swelling similar to a sunburn. This is normal and typical with the Bradly-Light procedure. In some cases, crusting or blistering of the skin that was treated may occur. This also is normal, and is actually a good sign that the treatment is working to destroy cancerous and precancerous cells. These symptoms typically resolve within a few days, but in some cases more robust reactions may last as long as 1-2 weeks.    Daily Instructions:  Your skin will be very sensitive to sunlight for up to 7 days. You will need to wear sunscreen (SPF30 or higher), as well as clothing that will protect your skin from the sun (wide-brimmed hat, long-sleeved shirts, etc). We recommend that you plan to use sunscreen forever! You must avoid direct sunlight for 48 hours following PDT treatment. Window light should also be avoided during the first 48 hours.    At Night:  If your skin is dry/red/tender/crusting, apply Vaseline/petroleum jelly to the area to moisturize the skin. If needed, you may also soak the treatment area in Epson Salt/[place]boro bath for a few minutes to help relieve itching and remove crust or scale.    Itching/ Burning Sensations can be treated by:  Keeping the treatment area covered with a layer of Vaseline/Pretoleum jelly/aquaphor.  Extra strength Tylenol and Benadryl  Epson Salt or [place]boro Soaks    Future Appointments:  Remember while the Bradly-Light treatment greatly reduces your risk of skin cancer and precancerous growths, it does not eliminate the risk completely. It remains important you have close follow-up with your dermatologist.     You should still have regular exams to detect skin cancer as early as  possible and treat precancers not destroyed by the Bradly-Light treatment. A follow up appointment should be made 1 to 3 months following your Bradly-Light treatment.    Call us with any questions or problems at [phone]              POST PDT       Do not pick at the skin while it is peeling.    Use gentle unscented mild cleanser such as cetaphil.   Ibuprofen can be taken as directed for any pain or discomfort.   Do not let skin dry out on the treated area. Apply a thin layer of Aquaphor or    Vaseline jelly   frequently / as needed.   Keep treated skin away from direct sunlight (outside and windows) for 48    hours after treatment.   Apply SPF everyday   Notify our office if you have any questions or concerns at [phone].

## 2024-04-18 ENCOUNTER — OFFICE VISIT (OUTPATIENT)
Dept: INTERNAL MEDICINE | Facility: CLINIC | Age: 89
End: 2024-04-18
Payer: MEDICARE

## 2024-04-18 ENCOUNTER — PATIENT MESSAGE (OUTPATIENT)
Dept: INTERNAL MEDICINE | Facility: CLINIC | Age: 89
End: 2024-04-18
Payer: MEDICARE

## 2024-04-18 VITALS
DIASTOLIC BLOOD PRESSURE: 70 MMHG | OXYGEN SATURATION: 98 % | BODY MASS INDEX: 32.23 KG/M2 | WEIGHT: 230.19 LBS | SYSTOLIC BLOOD PRESSURE: 132 MMHG | HEIGHT: 71 IN | HEART RATE: 78 BPM

## 2024-04-18 DIAGNOSIS — I10 PRIMARY HYPERTENSION: ICD-10-CM

## 2024-04-18 DIAGNOSIS — N39.0 ACUTE UTI: Primary | ICD-10-CM

## 2024-04-18 LAB
BILIRUB SERPL-MCNC: NEGATIVE MG/DL
BLOOD, POC UA: NORMAL
GLUCOSE UR QL STRIP: NEGATIVE
KETONES UR QL STRIP: NORMAL
LEUKOCYTE ESTERASE URINE, POC: NORMAL
NITRITE, POC UA: POSITIVE
PH, POC UA: 6
PROTEIN, POC: 30
SPECIFIC GRAVITY, POC UA: 1.01
UROBILINOGEN, POC UA: 4

## 2024-04-18 PROCEDURE — 99214 OFFICE O/P EST MOD 30 MIN: CPT | Mod: PBBFAC | Performed by: PHYSICIAN ASSISTANT

## 2024-04-18 PROCEDURE — 99999 PR PBB SHADOW E&M-EST. PATIENT-LVL IV: CPT | Mod: PBBFAC,,, | Performed by: PHYSICIAN ASSISTANT

## 2024-04-18 PROCEDURE — 87077 CULTURE AEROBIC IDENTIFY: CPT | Performed by: PHYSICIAN ASSISTANT

## 2024-04-18 PROCEDURE — 99214 OFFICE O/P EST MOD 30 MIN: CPT | Mod: S$PBB,,, | Performed by: PHYSICIAN ASSISTANT

## 2024-04-18 PROCEDURE — 87186 SC STD MICRODIL/AGAR DIL: CPT | Performed by: PHYSICIAN ASSISTANT

## 2024-04-18 PROCEDURE — 81003 URINALYSIS AUTO W/O SCOPE: CPT | Mod: PBBFAC | Performed by: PHYSICIAN ASSISTANT

## 2024-04-18 PROCEDURE — 99999PBSHW POCT URINALYSIS: Mod: PBBFAC,,,

## 2024-04-18 PROCEDURE — 87088 URINE BACTERIA CULTURE: CPT | Performed by: PHYSICIAN ASSISTANT

## 2024-04-18 PROCEDURE — 87086 URINE CULTURE/COLONY COUNT: CPT | Performed by: PHYSICIAN ASSISTANT

## 2024-04-18 RX ORDER — CEPHALEXIN 500 MG/1
500 CAPSULE ORAL EVERY 12 HOURS
Qty: 14 CAPSULE | Refills: 0 | Status: SHIPPED | OUTPATIENT
Start: 2024-04-18 | End: 2024-04-23 | Stop reason: ALTCHOICE

## 2024-04-18 NOTE — PROGRESS NOTES
"Subjective     Patient ID: Derik Wolfe is a 88 y.o. male.    Chief Complaint: Urinary Tract Infection    HPI      Established pt of Girish Seals MD (new to me)    C/o dysuria, darker yellow urine, urinary freq onset about 5 days ago. Started increasing water cranberry and its better. No fevers, n/v/d, hematuria, rectal pain, or difficulty urinating.     Past Medical History:   Diagnosis Date    AK (actinic keratosis)     arms and forehead treated with efudex    Calcium oxalate renal stones     Gout flare     Hypertension     Obesity     SCC (squamous cell carcinoma) excised 8/10/18    R upper back    Squamous cell carcinoma 2014    right upper nose     Social History     Tobacco Use    Smoking status: Former     Passive exposure: Never    Smokeless tobacco: Never    Tobacco comments:     quit in 1980's   Substance Use Topics    Alcohol use: Yes     Alcohol/week: 1.0 standard drink of alcohol     Types: 1 Cans of beer per week    Drug use: No     Review of patient's allergies indicates:   Allergen Reactions    Codeine Other (See Comments) and Hives     Other reaction(s): Stomach upset         Review of Systems   Constitutional:  Negative for chills, diaphoresis and fever.   Gastrointestinal:  Negative for abdominal pain, nausea and vomiting.   Genitourinary:  Positive for dysuria and frequency. Negative for flank pain and hematuria.   Integumentary:  Negative for rash.          Objective  /70   Pulse 78   Ht 5' 11" (1.803 m)   Wt 104.4 kg (230 lb 2.6 oz)   SpO2 98%   BMI 32.10 kg/m²       Physical Exam  Constitutional:       General: He is not in acute distress.     Appearance: He is normal weight.   Cardiovascular:      Rate and Rhythm: Normal rate and regular rhythm.   Pulmonary:      Effort: Pulmonary effort is normal. No respiratory distress.   Abdominal:      Tenderness: There is no abdominal tenderness. There is no right CVA tenderness, left CVA tenderness or guarding.   Neurological:     "  Mental Status: He is alert.            Assessment and Plan     1. Acute UTI  -     POCT URINALYSIS (+nitrite, +leuk, +blood)  -     Urine culture  -     cephALEXin (KEFLEX) 500 MG capsule; Take 1 capsule (500 mg total) by mouth every 12 (twelve) hours. for 7 days  Dispense: 14 capsule; Refill: 0    2. Primary hypertension  Pt BP log reviewed  BP at goal  Continue current meds (atenolol 25mg)      Will follow up urine culture  RTC precautions discussed      Ashley Mosqueda PA-C

## 2024-04-20 LAB — BACTERIA UR CULT: ABNORMAL

## 2024-04-23 ENCOUNTER — NURSE TRIAGE (OUTPATIENT)
Dept: ADMINISTRATIVE | Facility: CLINIC | Age: 89
End: 2024-04-23
Payer: MEDICARE

## 2024-04-23 DIAGNOSIS — N39.0 ACUTE UTI: Primary | ICD-10-CM

## 2024-04-23 RX ORDER — PHENAZOPYRIDINE HYDROCHLORIDE 200 MG/1
200 TABLET, FILM COATED ORAL 3 TIMES DAILY PRN
Qty: 9 TABLET | Refills: 0 | Status: SHIPPED | OUTPATIENT
Start: 2024-04-23 | End: 2024-04-26

## 2024-04-23 RX ORDER — CIPROFLOXACIN 500 MG/1
500 TABLET ORAL 2 TIMES DAILY
Qty: 14 TABLET | Refills: 0 | Status: SHIPPED | OUTPATIENT
Start: 2024-04-23 | End: 2024-04-30

## 2024-04-24 ENCOUNTER — TELEPHONE (OUTPATIENT)
Dept: INTERNAL MEDICINE | Facility: CLINIC | Age: 89
End: 2024-04-24
Payer: MEDICARE

## 2024-04-24 NOTE — TELEPHONE ENCOUNTER
----- Message from Kvng Awan sent at 4/24/2024  1:42 PM CDT -----  Contact: pt  Type: Requesting to speak with nurse        Who Called: PT  Regarding: reaction to medicine. States her has been to the bathroom 25 times    ciprofloxacin HCl (CIPRO) 500 MG tablet for urine infection.   Would the patient rather a call back or a response via [facility]? Call back  Best Call Back Number:  [phone]  Additional Information:

## 2024-05-08 ENCOUNTER — TELEPHONE (OUTPATIENT)
Dept: INTERNAL MEDICINE | Facility: CLINIC | Age: 89
End: 2024-05-08
Payer: MEDICARE

## 2024-05-08 NOTE — TELEPHONE ENCOUNTER
Spoke to pt today and he said the sx's of UTI have not returned.  He is feeling so much better.  Encouraged him to keep drinking water.

## 2024-05-30 ENCOUNTER — PATIENT MESSAGE (OUTPATIENT)
Dept: INTERNAL MEDICINE | Facility: CLINIC | Age: 89
End: 2024-05-30
Payer: MEDICARE

## 2024-06-04 ENCOUNTER — OFFICE VISIT (OUTPATIENT)
Dept: INTERNAL MEDICINE | Facility: CLINIC | Age: 89
End: 2024-06-04
Payer: MEDICARE

## 2024-06-04 ENCOUNTER — HOSPITAL ENCOUNTER (OUTPATIENT)
Dept: RADIOLOGY | Facility: HOSPITAL | Age: 89
Discharge: HOME | End: 2024-06-04
Attending: INTERNAL MEDICINE
Payer: MEDICARE

## 2024-06-04 VITALS — DIASTOLIC BLOOD PRESSURE: 78 MMHG | SYSTOLIC BLOOD PRESSURE: 136 MMHG | BODY MASS INDEX: 31.66 KG/M2 | WEIGHT: 227 LBS

## 2024-06-04 DIAGNOSIS — I10 PRIMARY HYPERTENSION: ICD-10-CM

## 2024-06-04 DIAGNOSIS — N39.0 URINARY TRACT INFECTION WITHOUT HEMATURIA, SITE UNSPECIFIED: ICD-10-CM

## 2024-06-04 DIAGNOSIS — R73.09 ELEVATED GLUCOSE LEVEL: ICD-10-CM

## 2024-06-04 DIAGNOSIS — M54.50 ACUTE RIGHT-SIDED LOW BACK PAIN WITHOUT SCIATICA: Primary | ICD-10-CM

## 2024-06-04 DIAGNOSIS — M54.50 ACUTE RIGHT-SIDED LOW BACK PAIN WITHOUT SCIATICA: ICD-10-CM

## 2024-06-04 DIAGNOSIS — Z87.442 HISTORY OF RENAL STONE: ICD-10-CM

## 2024-06-04 PROCEDURE — 99999 PR PBB SHADOW E&M-EST. PATIENT-LVL III: CPT | Mod: PBBFAC,,, | Performed by: INTERNAL MEDICINE

## 2024-06-04 PROCEDURE — 72100 X-RAY EXAM L-S SPINE 2/3 VWS: CPT | Mod: 26,,, | Performed by: RADIOLOGY

## 2024-06-04 PROCEDURE — 99214 OFFICE O/P EST MOD 30 MIN: CPT | Mod: S$PBB,,, | Performed by: INTERNAL MEDICINE

## 2024-06-04 PROCEDURE — 72100 X-RAY EXAM L-S SPINE 2/3 VWS: CPT | Mod: TC

## 2024-06-04 PROCEDURE — 99213 OFFICE O/P EST LOW 20 MIN: CPT | Mod: PBBFAC,25 | Performed by: INTERNAL MEDICINE

## 2024-06-04 NOTE — PROGRESS NOTES
Subjective:       Patient ID: Derik Wolfe is a 88 y.o. male.    Chief Complaint: Low-back Pain (Right low back)    HPI:  Patient comes in with chief complaint of right low back pain.  He said it seems to have started a few days ago.  Worse with certain movements.  No nausea or vomiting.  No recurrent urinary symptoms but he worried that it could relate to a recent UTI from about 6 weeks ago.  UTI back in April, 4/18/24, treated with Keflex and Pyridium. He feels the pyridium was making things worse and stopped it.  He then felt the urinary symptoms were not improving.  We changed to Cipro then he improved. Now recently low back pain on the right.   Had Kidney stone in the past.     Low-back Pain  Associated symptoms include arthralgias. Pertinent negatives include no fever.   Review of Systems   Constitutional:  Negative for fever.   Genitourinary:  Negative for difficulty urinating, frequency and hematuria.   Musculoskeletal:  Positive for arthralgias and back pain.           Past Medical History:   Diagnosis Date    AK (actinic keratosis)     arms and forehead treated with efudex    Calcium oxalate renal stones     Gout flare     Hypertension     Obesity     SCC (squamous cell carcinoma) excised 8/10/18    R upper back    Squamous cell carcinoma 2014    right upper nose     Past Surgical History:   Procedure Laterality Date    KNEE ARTHROSCOPY        Patient Active Problem List   Diagnosis    Hypertension        Objective:      Physical Exam  Constitutional:       Appearance: Normal appearance. He is obese.   Cardiovascular:      Rate and Rhythm: Normal rate and regular rhythm.   Pulmonary:      Effort: Pulmonary effort is normal. No respiratory distress.      Breath sounds: No wheezing.   Abdominal:      Tenderness: There is no abdominal tenderness.   Musculoskeletal:         General: Tenderness (right knee) present.      Right lower leg: Edema present.      Left lower leg: Edema present.      Comments:  Right low back is tender in the mid lumbar region.  Not tender to touch but tender tilting to the right, extending the low back.  Negative straight leg raise   Skin:     Findings: No rash.   Neurological:      Mental Status: He is alert.   Psychiatric:         Mood and Affect: Mood normal.         Thought Content: Thought content normal.         Assessment:       Problem List Items Addressed This Visit          Cardiac/Vascular    Hypertension    Relevant Orders    Hemoglobin A1C    Basic Metabolic Panel    CBC Auto Differential    Urinalysis, Reflex to Urine Culture Urine, Clean Catch    X-Ray Lumbar Spine Ap And Lateral     Other Visit Diagnoses       Acute right-sided low back pain without sciatica    -  Primary    Relevant Orders    Hemoglobin A1C    Basic Metabolic Panel    CBC Auto Differential    Urinalysis, Reflex to Urine Culture Urine, Clean Catch    X-Ray Lumbar Spine Ap And Lateral    Elevated glucose level        Relevant Orders    Hemoglobin A1C    Basic Metabolic Panel    CBC Auto Differential    Urinalysis, Reflex to Urine Culture Urine, Clean Catch    X-Ray Lumbar Spine Ap And Lateral    Urinary tract infection without hematuria, site unspecified        Relevant Orders    Urinalysis, Reflex to Urine Culture Urine, Clean Catch    X-Ray Lumbar Spine Ap And Lateral    History of renal stone        Relevant Orders    Urinalysis, Reflex to Urine Culture Urine, Clean Catch    X-Ray Lumbar Spine Ap And Lateral            Plan:         Derik was seen today for low-back pain.    Diagnoses and all orders for this visit:    Acute right-sided low back pain without sciatica  -     Hemoglobin A1C; Future  -     Basic Metabolic Panel; Future  -     CBC Auto Differential; Future  -     Urinalysis, Reflex to Urine Culture Urine, Clean Catch; Future  -     X-Ray Lumbar Spine Ap And Lateral; Future    Primary hypertension  -     Hemoglobin A1C; Future  -     Basic Metabolic Panel; Future  -     CBC Auto  "Differential; Future  -     Urinalysis, Reflex to Urine Culture Urine, Clean Catch; Future  -     X-Ray Lumbar Spine Ap And Lateral; Future    Elevated glucose level  -     Hemoglobin A1C; Future  -     Basic Metabolic Panel; Future  -     CBC Auto Differential; Future  -     Urinalysis, Reflex to Urine Culture Urine, Clean Catch; Future  -     X-Ray Lumbar Spine Ap And Lateral; Future    Urinary tract infection without hematuria, site unspecified  -     Urinalysis, Reflex to Urine Culture Urine, Clean Catch; Future  -     X-Ray Lumbar Spine Ap And Lateral; Future    History of renal stone  -     Urinalysis, Reflex to Urine Culture Urine, Clean Catch; Future  -     X-Ray Lumbar Spine Ap And Lateral; Future       I believe this is more likely musculoskeletal rather than urinary.  Heat, Tylenol.    Review studies.              Portions of this note may have been created with voice recognition software. Occasional "wrong-word" or "sound-a-like" substitutions may have occurred due to the inherent limitations of voice recognition software. Please, read the note carefully and recognize, using context, where substitutions have occurred.  "

## 2024-06-10 ENCOUNTER — PATIENT MESSAGE (OUTPATIENT)
Dept: INTERNAL MEDICINE | Facility: CLINIC | Age: 89
End: 2024-06-10
Payer: MEDICARE

## 2024-06-11 ENCOUNTER — PATIENT MESSAGE (OUTPATIENT)
Dept: INTERNAL MEDICINE | Facility: CLINIC | Age: 89
End: 2024-06-11
Payer: MEDICARE

## 2024-06-19 ENCOUNTER — OFFICE VISIT (OUTPATIENT)
Dept: URGENT CARE | Facility: CLINIC | Age: 89
End: 2024-06-19
Payer: MEDICARE

## 2024-06-19 VITALS
SYSTOLIC BLOOD PRESSURE: 132 MMHG | BODY MASS INDEX: 31.78 KG/M2 | HEART RATE: 65 BPM | RESPIRATION RATE: 17 BRPM | OXYGEN SATURATION: 96 % | DIASTOLIC BLOOD PRESSURE: 77 MMHG | TEMPERATURE: 98 F | WEIGHT: 227 LBS | HEIGHT: 71 IN

## 2024-06-19 DIAGNOSIS — S81.811A ISTAP TYPE 1 SKIN TEAR OF RIGHT LOWER LEG: Primary | ICD-10-CM

## 2024-06-19 PROCEDURE — 99213 OFFICE O/P EST LOW 20 MIN: CPT | Mod: S$GLB,,, | Performed by: NURSE PRACTITIONER

## 2024-06-19 RX ORDER — MUPIROCIN 20 MG/G
OINTMENT TOPICAL 2 TIMES DAILY
Qty: 30 G | Refills: 0 | Status: SHIPPED | OUTPATIENT
Start: 2024-06-19 | End: 2024-06-26

## 2024-06-19 RX ORDER — CEPHALEXIN 500 MG/1
500 CAPSULE ORAL EVERY 8 HOURS
Qty: 21 CAPSULE | Refills: 0 | Status: SHIPPED | OUTPATIENT
Start: 2024-06-19 | End: 2024-06-26

## 2024-06-24 ENCOUNTER — OFFICE VISIT (OUTPATIENT)
Dept: DERMATOLOGY | Facility: CLINIC | Age: 89
End: 2024-06-24
Payer: MEDICARE

## 2024-06-24 DIAGNOSIS — D48.5 NEOPLASM OF UNCERTAIN BEHAVIOR OF SKIN: Primary | ICD-10-CM

## 2024-06-24 DIAGNOSIS — L57.0 AK (ACTINIC KERATOSIS): ICD-10-CM

## 2024-06-24 DIAGNOSIS — Z85.828 HISTORY OF NONMELANOMA SKIN CANCER: ICD-10-CM

## 2024-06-24 PROCEDURE — 17004 DESTROY PREMAL LESIONS 15/>: CPT | Mod: 59,PBBFAC | Performed by: STUDENT IN AN ORGANIZED HEALTH CARE EDUCATION/TRAINING PROGRAM

## 2024-06-24 PROCEDURE — 17004 DESTROY PREMAL LESIONS 15/>: CPT | Mod: S$PBB,,, | Performed by: STUDENT IN AN ORGANIZED HEALTH CARE EDUCATION/TRAINING PROGRAM

## 2024-06-24 PROCEDURE — 11102 TANGNTL BX SKIN SINGLE LES: CPT | Mod: S$PBB,XS,, | Performed by: STUDENT IN AN ORGANIZED HEALTH CARE EDUCATION/TRAINING PROGRAM

## 2024-06-24 PROCEDURE — 99213 OFFICE O/P EST LOW 20 MIN: CPT | Mod: 25,S$PBB,, | Performed by: STUDENT IN AN ORGANIZED HEALTH CARE EDUCATION/TRAINING PROGRAM

## 2024-06-24 PROCEDURE — 11102 TANGNTL BX SKIN SINGLE LES: CPT | Mod: PBBFAC,XS | Performed by: STUDENT IN AN ORGANIZED HEALTH CARE EDUCATION/TRAINING PROGRAM

## 2024-06-24 PROCEDURE — 99213 OFFICE O/P EST LOW 20 MIN: CPT | Mod: PBBFAC | Performed by: STUDENT IN AN ORGANIZED HEALTH CARE EDUCATION/TRAINING PROGRAM

## 2024-06-24 PROCEDURE — 99999 PR PBB SHADOW E&M-EST. PATIENT-LVL III: CPT | Mod: PBBFAC,,, | Performed by: STUDENT IN AN ORGANIZED HEALTH CARE EDUCATION/TRAINING PROGRAM

## 2024-06-24 NOTE — PROGRESS NOTES
Subjective:      Patient ID:  Derik Wolfe is a 88 y.o. male who presents for   Chief Complaint   Patient presents with    Skin Check     Ubse      89 yo M here for follow up UBSE here with wife today    Patient with new are of concern:   Location: tip of nose   Duration: 2 years   Symptoms: getting darker   Previous treatments: none     Did PDT 4-8-2024 to arms- some areas improved but a lot are still there    Pertinent history:  History of blistering sunburns: No  History of tanning bed use: No  Family history of melanoma: Possibly, mother had skin cancer on her hands  Personal history of mole removal: Yes  Personal history of skin cancer: Yes- hx of SCC in 2020, 2018, 2014.   SCC excised from L upper arm by Dr. Hager on 10/23/20  SCC excised from R upper back in August 2018.  SCC excised from R upper nose by Dr. Jones on 6/2014.         Review of Systems   Skin:  Positive for activity-related sunscreen use and wears hat. Negative for daily sunscreen use and recent sunburn.   Hematologic/Lymphatic: Does not bruise/bleed easily.       Objective:   Physical Exam   Constitutional: He appears well-developed and well-nourished. No distress.   Neurological: He is alert and oriented to person, place, and time. He is not disoriented.   Psychiatric: He has a normal mood and affect.   Skin:   Areas Examined (abnormalities noted in diagram):   Scalp / Hair Palpated and Inspected  Head / Face Inspection Performed  Neck Inspection Performed  Chest / Axilla Inspection Performed  Abdomen Inspection Performed  Back Inspection Performed  RUE Inspected  LUE Inspection Performed                Diagram Legend     Erythematous scaling macule/papule c/w actinic keratosis       Vascular papule c/w angioma      Pigmented verrucoid papule/plaque c/w seborrheic keratosis      Yellow umbilicated papule c/w sebaceous hyperplasia      Irregularly shaped tan macule c/w lentigo     1-2 mm smooth white papules consistent with Milia       Movable subcutaneous cyst with punctum c/w epidermal inclusion cyst      Subcutaneous movable cyst c/w pilar cyst      Firm pink to brown papule c/w dermatofibroma      Pedunculated fleshy papule(s) c/w skin tag(s)      Evenly pigmented macule c/w junctional nevus     Mildly variegated pigmented, slightly irregular-bordered macule c/w mildly atypical nevus      Flesh colored to evenly pigmented papule c/w intradermal nevus       Pink pearly papule/plaque c/w basal cell carcinoma      Erythematous hyperkeratotic cursted plaque c/w SCC      Surgical scar with no sign of skin cancer recurrence      Open and closed comedones      Inflammatory papules and pustules      Verrucoid papule consistent consistent with wart     Erythematous eczematous patches and plaques     Dystrophic onycholytic nail with subungual debris c/w onychomycosis     Umbilicated papule    Erythematous-base heme-crusted tan verrucoid plaque consistent with inflamed seborrheic keratosis     Erythematous Silvery Scaling Plaque c/w Psoriasis     See annotation              Assessment / Plan:      Pathology Orders:       Normal Orders This Visit    Specimen to Pathology, Dermatology     Questions:    Procedure Type: Dermatology and skin neoplasms    Number of Specimens: 1    ------------------------: -------------------------    Spec 1 Procedure: Biopsy    Spec 1 Clinical Impression: very dark brown macule with asymmetry around follicles- ddx melanoma v dyplastic nevus v SK v other    Spec 1 Source: nasal tip    Release to patient: Immediate    Release to patient:           Neoplasm of uncertain behavior of skin  Shave biopsy procedure note:    Shave biopsy performed after verbal consent including risk of infection, scar, recurrence, need for additional treatment of site. Area prepped with alcohol, anesthetized with approximately 1.0 cc of 1% lidocaine with epinephrine. Lesional tissue shaved with razor blade. Hemostasis achieved with application of  aluminum chloride followed by hyfrecation. No complications. Dressing applied. Wound care explained.    AK (actinic keratosis)  Cryosurgery Procedure Note    Verbal consent from the patient is obtained including, but not limited to, risk of hypopigmentation/hyperpigmentation, scar, recurrence of lesion. The patient is aware of the precancerous quality and need for treatment of these lesions. Liquid nitrogen cryosurgery is applied to the 17 actinic keratoses, as detailed in the physical exam, to produce a freeze injury. The patient is aware that blisters may form and is instructed on wound care with gentle cleansing and use of vaseline ointment to keep moist until healed. The patient is supplied a handout on cryosurgery and is instructed to call if lesions do not completely resolve.    Minimal response to first PDT session, will schedule second session in fall due to diffuse field cancerization of dorsal forearms  Placed order/sent message to schedule    History of NMSC  Examined areas of prior scars, no evidence of recurrence  - Continue skin exams at least annually, increased risk of additional skin cancers developing in the future  - I discussed the importance of sun protection with the patient. Recommend daily use of SPF 30+ physical or mineral sunscreen, apply 15 minutes before going outdoors and reapply every 2 hours. Discussed wide-brimmed hats and UPF 50+ clothing.    RTC pending bx results

## 2024-07-03 ENCOUNTER — PATIENT MESSAGE (OUTPATIENT)
Dept: DERMATOLOGY | Facility: CLINIC | Age: 89
End: 2024-07-03
Payer: MEDICARE

## 2024-07-08 ENCOUNTER — TELEPHONE (OUTPATIENT)
Dept: DERMATOLOGY | Facility: CLINIC | Age: 89
End: 2024-07-08
Payer: MEDICARE

## 2024-07-08 NOTE — TELEPHONE ENCOUNTER
Called patient to schedule 3 mo FBSE with Dr. Yates. Pt confirmed date and time and verbalized understanding. No further questions at the moment.     ----- Message from Emilia Yates MD sent at 7/3/2024  1:53 PM CDT -----  Dr. 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 Case prefers surg onc/ENT referral, please let me know    [facility] team: please make a 3 month appt for FBSE. Please call pt to set up this appt and if he has any further questions for me- please get some days/times that work for him so I can call him since I am out of town for 2 weeks. Thanks.

## 2024-07-18 ENCOUNTER — TELEPHONE (OUTPATIENT)
Dept: DERMATOLOGY | Facility: CLINIC | Age: 89
End: 2024-07-18
Payer: MEDICARE

## 2024-07-22 ENCOUNTER — PROCEDURE VISIT (OUTPATIENT)
Dept: DERMATOLOGY | Facility: CLINIC | Age: 89
End: 2024-07-22
Payer: MEDICARE

## 2024-07-22 VITALS
HEIGHT: 71 IN | HEART RATE: 65 BPM | BODY MASS INDEX: 31.79 KG/M2 | SYSTOLIC BLOOD PRESSURE: 144 MMHG | WEIGHT: 227.06 LBS | DIASTOLIC BLOOD PRESSURE: 84 MMHG

## 2024-07-22 DIAGNOSIS — C43.31 MALIGNANT MELANOMA OF NOSE: Primary | ICD-10-CM

## 2024-07-22 PROCEDURE — 88305 TISSUE EXAM BY PATHOLOGIST: CPT | Performed by: PATHOLOGY

## 2024-07-22 PROCEDURE — 99499 UNLISTED E&M SERVICE: CPT | Mod: S$PBB,,, | Performed by: DERMATOLOGY

## 2024-07-22 PROCEDURE — 11643 EXC F/E/E/N/L MAL+MRG 2.1-3: CPT | Mod: S$PBB,,, | Performed by: DERMATOLOGY

## 2024-07-22 PROCEDURE — 11643 EXC F/E/E/N/L MAL+MRG 2.1-3: CPT | Mod: PBBFAC | Performed by: DERMATOLOGY

## 2024-07-22 NOTE — PROGRESS NOTES
PROCEDURE: 360-degree continuous en face margin-controlled excision with rush permanent sections and subsequent delayed repair    REFERRING PROVIDER:  Emilia Yates MD    ANESTHETIC: 5 cc 1% Lidocaine with Epinephrine 1:100,000    SURGICAL PREP: Hibiclens, prepped by Tracy Baltazar, Surg Tech    SURGEON: Edd Case MD    ASSISTANTS: Alix Marti PA-C and Luzma Turner    PREOPERATIVE DIAGNOSIS:  Melanoma, 0.3 mm depth , path # [id number].    POSTOPERATIVE DIAGNOSIS:  Melanoma, 0.3 mm depth    PATHOLOGIC DIAGNOSIS: Pending    LOCATION: nasal tip. Location verified with Dr. Yates's clinical photograph. Patient also verified location with hand held mirror.    INITIAL LESION SIZE: 0.5 x 0.5 cm    DEFECT SIZE: 2.5 x 2.5 cm    THICKNESS OF MELANOMA: 0.3 mm    EXCISED MARGINS: 1 cm    DEPTH OF EXCISION DOWN TO FASCIA: yes    PREPARATION:  The diagnosis, procedure, alternatives, benefits and risks, including but not limited to: drug reactions, pain, scar or cosmetic defect, local sensation disturbances, and/or recurrence of present condition were explained to the patient. The patient elected to proceed.    PROCEDURE:  The area involved by the melanoma was marked out with a surgical marking pen. The area of nasal tip was prepped, draped, and anesthetized in the usual sterile fashion. Lesional tissue was carefully marked with at least 1 cm margins of clinically normal skin in all directions. An excision was performed with a #15 blade carried down completely through the dermis to the deep subcutaneous tissue plane. A short suture was placed superiorly at the 12 o'clock position and a long suture was placed laterally at the 3 o'clock position. The lesion was then removed in total and submitted for histological margin evaluation. Then, electrocoagulation was used to obtain hemostasis. Blood loss was minimal.    The patient tolerated the procedure well.    The area was cleaned and dressed appropriately and the  "patient was given wound care instructions, as well as an appointment for follow-up evaluation tomorrow for possible further excision pending pathology report.    Vitals:    07/22/24 0745 07/22/24 0936   BP: (!) 152/80 (!) 144/84   BP Location: Left arm    Patient Position: Sitting    BP Method: Small (Automatic)    Pulse: 65 (P) 68   Weight: 103 kg (227 lb 1.2 oz)    Height: 5' 10.98" (1.803 m)          "

## 2024-07-23 ENCOUNTER — PROCEDURE VISIT (OUTPATIENT)
Dept: DERMATOLOGY | Facility: CLINIC | Age: 89
End: 2024-07-23
Payer: MEDICARE

## 2024-07-23 VITALS
SYSTOLIC BLOOD PRESSURE: 158 MMHG | HEART RATE: 75 BPM | WEIGHT: 227.06 LBS | BODY MASS INDEX: 31.79 KG/M2 | HEIGHT: 71 IN | DIASTOLIC BLOOD PRESSURE: 88 MMHG

## 2024-07-23 DIAGNOSIS — C43.31 MALIGNANT MELANOMA OF NOSE: Primary | ICD-10-CM

## 2024-07-23 LAB
FINAL PATHOLOGIC DIAGNOSIS: NORMAL
GROSS: NORMAL
Lab: NORMAL
MICROSCOPIC EXAM: NORMAL

## 2024-07-23 PROCEDURE — 11641 EXC F/E/E/N/L MAL+MRG 0.6-1: CPT | Mod: 79,S$PBB,, | Performed by: DERMATOLOGY

## 2024-07-23 PROCEDURE — 11641 EXC F/E/E/N/L MAL+MRG 0.6-1: CPT | Mod: 79,PBBFAC | Performed by: DERMATOLOGY

## 2024-07-23 PROCEDURE — 88305 TISSUE EXAM BY PATHOLOGIST: CPT | Performed by: PATHOLOGY

## 2024-07-23 PROCEDURE — 99499 UNLISTED E&M SERVICE: CPT | Mod: S$PBB,,, | Performed by: DERMATOLOGY

## 2024-07-23 NOTE — PROGRESS NOTES
PROCEDURE: 360-degree continuous en face margin-controlled excision with rush permanent sections and subsequent delayed repair, Stage II. Verbal of (+) margins from 6:00-9:00 around yesterday's section per Dr. Carmichael.    REFERRING PROVIDER:  Emilia Yates MD    ANESTHETIC: 2.5 cc 1% Lidocaine with Epinephrine 1:100,000    SURGICAL PREP: Hibiclens, prepped by Tracy Baltazar, Surg Tech    SURGEON: Edd Case MD    ASSISTANTS: Alix Marti PA-C and Tracy Baltazar Surg [place]    PREOPERATIVE DIAGNOSIS:  Melanoma, 0.3 mm depth    POSTOPERATIVE DIAGNOSIS:  Melanoma, 0.3 mm depth    PATHOLOGIC DIAGNOSIS: Pending    LOCATION: nasal tip, 6:00 to 9:00.     INITIAL LESION SIZE: 2.5 x 2.5 cm    DEFECT SIZE: 2.5 x 3.0 cm    THICKNESS OF MELANOMA: 0.3 mm    EXCISED MARGINS: 0.5 mm    DEPTH OF EXCISION DOWN TO FASCIA: yes    PREPARATION:  The diagnosis, procedure, alternatives, benefits and risks, including but not limited to: drug reactions, pain, scar or cosmetic defect, local sensation disturbances, and/or recurrence of present condition were explained to the patient. The patient elected to proceed.    PROCEDURE:  The area involved by the melanoma was marked out with a surgical marking pen. The area of nasal tip was prepped, draped, and anesthetized in the usual sterile fashion. Lesional tissue was carefully marked with at least 5 mm margins of clinically normal skin from 6 o' clock to 9 o' clock. An excision was performed with a #15 blade carried down completely through the dermis to the deep subcutaneous tissue plane. A short suture was placed at the 6 o' clock position and a long suture was placed at the 9 o' clock position. The lesion was then removed in total and submitted for histological margin evaluation. One specimen was removed in total: 6-9, with green ink identifying the new epidermal margin. Then, electrocoagulation was used to obtain hemostasis. Blood loss was minimal.    The patient tolerated the procedure  "well.    The area was cleaned and dressed appropriately and the patient was given wound care instructions, as well as an appointment for follow-up evaluation tomorrow for possible further excision pending pathology report.    Vitals:    07/23/24 1102 07/23/24 1138   BP: (!) 181/88 (!) 158/88   BP Location: Right arm    Patient Position: Sitting    BP Method: Medium (Automatic)    Pulse: 68 75   Weight: 103 kg (227 lb 1.2 oz)    Height: 5' 10.98" (1.803 m)        "

## 2024-07-24 ENCOUNTER — PROCEDURE VISIT (OUTPATIENT)
Dept: DERMATOLOGY | Facility: CLINIC | Age: 89
End: 2024-07-24
Payer: MEDICARE

## 2024-07-24 ENCOUNTER — TELEPHONE (OUTPATIENT)
Dept: OTOLARYNGOLOGY | Facility: CLINIC | Age: 89
End: 2024-07-24
Payer: MEDICARE

## 2024-07-24 VITALS
HEART RATE: 70 BPM | WEIGHT: 227.06 LBS | HEIGHT: 71 IN | SYSTOLIC BLOOD PRESSURE: 165 MMHG | DIASTOLIC BLOOD PRESSURE: 84 MMHG | BODY MASS INDEX: 31.79 KG/M2

## 2024-07-24 DIAGNOSIS — C43.31 MALIGNANT MELANOMA OF NOSE: Primary | ICD-10-CM

## 2024-07-24 LAB
FINAL PATHOLOGIC DIAGNOSIS: NORMAL
GROSS: NORMAL
Lab: NORMAL
MICROSCOPIC EXAM: NORMAL

## 2024-07-24 PROCEDURE — 11641 EXC F/E/E/N/L MAL+MRG 0.6-1: CPT | Mod: 58,PBBFAC | Performed by: DERMATOLOGY

## 2024-07-24 PROCEDURE — 99499 UNLISTED E&M SERVICE: CPT | Mod: S$PBB,,, | Performed by: DERMATOLOGY

## 2024-07-24 PROCEDURE — 88305 TISSUE EXAM BY PATHOLOGIST: CPT | Performed by: PATHOLOGY

## 2024-07-24 PROCEDURE — 11641 EXC F/E/E/N/L MAL+MRG 0.6-1: CPT | Mod: 58,S$PBB,, | Performed by: DERMATOLOGY

## 2024-07-24 NOTE — PROGRESS NOTES
PROCEDURE: 360-degree continuous en face margin-controlled excision with rush permanent sections and subsequent delayed repair, Stage III. (+) margins from 6:00 to 9:00 around yesterday's section per Dr. Carmichael.    REFERRING PROVIDER:  Emilia Yates MD    ANESTHETIC: 2.5 cc 1% Lidocaine with Epinephrine 1:100,000    SURGICAL PREP: Hibiclens, prepped by Noreen De La Rosa MA    SURGEON: Edd Case MD    ASSISTANTS: Alix Marti PA-C and Noreen De La Rosa MA    PREOPERATIVE DIAGNOSIS:  Melanoma, 0.3 mm depth    POSTOPERATIVE DIAGNOSIS:  Melanoma, 0.3 mm depth    PATHOLOGIC DIAGNOSIS: Pending    LOCATION: nasal tip, 6:00-9:00.     INITIAL LESION SIZE: 2.5 x 2.7 cm    DEFECT SIZE: 2.5 x 3.2 cm    THICKNESS OF MELANOMA: 0.3 mm    EXCISED MARGINS: 5 mm    DEPTH OF EXCISION DOWN TO FASCIA: yes    PREPARATION:  The diagnosis, procedure, alternatives, benefits and risks, including but not limited to: drug reactions, pain, scar or cosmetic defect, local sensation disturbances, and/or recurrence of present condition were explained to the patient. The patient elected to proceed.    PROCEDURE:  The area involved by the melanoma was marked out with a surgical marking pen. The area of nasal tip was prepped, draped, and anesthetized in the usual sterile fashion. Lesional tissue was carefully marked with at least 5 mm margins of clinically normal skin from 6 o' clock to 9 o' clock. An excision was performed with a #15 blade carried down completely through the dermis to the deep subcutaneous tissue plane. A short suture was placed at the 6 o clock position and a long suture was placed at the 9 o clock position. The lesion was then removed in total and submitted for histological margin evaluation. One specimen was removed in total: 6-9, with green ink identifying the new epidermal margin. Then, electrocoagulation was used to obtain hemostasis. Blood loss was minimal.    The patient tolerated the procedure well.    The area was  "cleaned and dressed appropriately and the patient was given wound care instructions, as well as an appointment for follow-up evaluation tomorrow for possible further excision pending pathology report.    Vitals:    07/24/24 1154 07/24/24 1248   BP: (!) 183/84 (!) 165/84   BP Location: Left arm    Patient Position: Sitting    BP Method: Small (Automatic)    Pulse: 70 70   Weight: 103 kg (227 lb 1.2 oz)    Height: 5' 10.98" (1.803 m)        ADDENDUM 7/25/24 - Margins now negative s/p 3 stages slow Mohs.  Pt informed, to f/u with Dr. Martinez in ENT tomorrow for repair.     "

## 2024-07-25 ENCOUNTER — PATIENT MESSAGE (OUTPATIENT)
Dept: OTOLARYNGOLOGY | Facility: CLINIC | Age: 89
End: 2024-07-25
Payer: MEDICARE

## 2024-07-25 ENCOUNTER — TELEPHONE (OUTPATIENT)
Dept: DERMATOLOGY | Facility: CLINIC | Age: 89
End: 2024-07-25
Payer: MEDICARE

## 2024-07-25 LAB
FINAL PATHOLOGIC DIAGNOSIS: NORMAL
GROSS: NORMAL
Lab: NORMAL

## 2024-07-25 NOTE — PRE-PROCEDURE INSTRUCTIONS
PreOp Instructions given:   - Verbal medication information (what to hold and what to take)   - NPO guidelines 2400  - Arrival place directions given; time to be given the day before procedure by the   Surgeon's Office DOSC - 1000  - Bathing with antibacterial soap   - Don't wear any jewelry or bring any valuables AM of surgery   - No makeup or moisturizer to face   - No perfume/cologne, powder, lotions or aftershave   Pt. verbalized understanding.   Pt denies any h/o Anesthesia/Sedation complications or side effects.

## 2024-07-25 NOTE — PLAN OF CARE
Surgery rescheduled to 10am tomorrow. Called patient to instruct him to arrive by 8am. He voiced understanding.     Kristofer Sigala MD  ENT PGY4

## 2024-07-25 NOTE — TELEPHONE ENCOUNTER
Pt with melanoma on nasal tip. Seeing Dr. Case for slow mohs. Let him know that the path report came back clear. He can keep the bandage we put on yesterday on until tomorrow when he has repair done by Dr. Martinez. Also offered to have him come in so it could be changed but he said he is fine waiting until tomorrow. Pt thanked me for the call.

## 2024-07-26 ENCOUNTER — ANESTHESIA EVENT (OUTPATIENT)
Dept: SURGERY | Facility: HOSPITAL | Age: 89
End: 2024-07-26
Payer: MEDICARE

## 2024-07-26 ENCOUNTER — ANESTHESIA (OUTPATIENT)
Dept: SURGERY | Facility: HOSPITAL | Age: 89
End: 2024-07-26
Payer: MEDICARE

## 2024-07-26 ENCOUNTER — HOSPITAL ENCOUNTER (OUTPATIENT)
Facility: HOSPITAL | Age: 89
Discharge: HOME | End: 2024-07-26
Attending: STUDENT IN AN ORGANIZED HEALTH CARE EDUCATION/TRAINING PROGRAM | Admitting: STUDENT IN AN ORGANIZED HEALTH CARE EDUCATION/TRAINING PROGRAM
Payer: MEDICARE

## 2024-07-26 VITALS
HEIGHT: 71 IN | HEART RATE: 78 BPM | BODY MASS INDEX: 30.94 KG/M2 | DIASTOLIC BLOOD PRESSURE: 81 MMHG | WEIGHT: 221 LBS | SYSTOLIC BLOOD PRESSURE: 185 MMHG | OXYGEN SATURATION: 97 % | RESPIRATION RATE: 15 BRPM | TEMPERATURE: 98 F

## 2024-07-26 DIAGNOSIS — Z98.890 MOHS DEFECT: Primary | ICD-10-CM

## 2024-07-26 DIAGNOSIS — L98.8 MOHS DEFECT: Primary | ICD-10-CM

## 2024-07-26 DIAGNOSIS — I48.91 A-FIB: ICD-10-CM

## 2024-07-26 LAB
OHS QRS DURATION: 138 MS
OHS QTC CALCULATION: 482 MS

## 2024-07-26 PROCEDURE — 36000706: Performed by: STUDENT IN AN ORGANIZED HEALTH CARE EDUCATION/TRAINING PROGRAM

## 2024-07-26 PROCEDURE — 71000044 HC DOSC ROUTINE RECOVERY FIRST HOUR: Performed by: STUDENT IN AN ORGANIZED HEALTH CARE EDUCATION/TRAINING PROGRAM

## 2024-07-26 PROCEDURE — 93005 ELECTROCARDIOGRAM TRACING: CPT

## 2024-07-26 PROCEDURE — 27201037 HC PRESSURE MONITORING SET UP

## 2024-07-26 PROCEDURE — 25000003 PHARM REV CODE 250

## 2024-07-26 PROCEDURE — 93010 ELECTROCARDIOGRAM REPORT: CPT | Mod: ,,, | Performed by: INTERNAL MEDICINE

## 2024-07-26 PROCEDURE — 25000003 PHARM REV CODE 250: Performed by: STUDENT IN AN ORGANIZED HEALTH CARE EDUCATION/TRAINING PROGRAM

## 2024-07-26 PROCEDURE — 15260 FTH/GFT FR N/E/E/L 20 SQCM/<: CPT | Mod: GC,,, | Performed by: STUDENT IN AN ORGANIZED HEALTH CARE EDUCATION/TRAINING PROGRAM

## 2024-07-26 PROCEDURE — 71000016 HC POSTOP RECOV ADDL HR: Performed by: STUDENT IN AN ORGANIZED HEALTH CARE EDUCATION/TRAINING PROGRAM

## 2024-07-26 PROCEDURE — 71000015 HC POSTOP RECOV 1ST HR: Performed by: STUDENT IN AN ORGANIZED HEALTH CARE EDUCATION/TRAINING PROGRAM

## 2024-07-26 PROCEDURE — 36000707: Performed by: STUDENT IN AN ORGANIZED HEALTH CARE EDUCATION/TRAINING PROGRAM

## 2024-07-26 PROCEDURE — 30801 ABLATE INF TURBINATE SUPERF: CPT | Mod: 51,GC,, | Performed by: STUDENT IN AN ORGANIZED HEALTH CARE EDUCATION/TRAINING PROGRAM

## 2024-07-26 PROCEDURE — 25000003 PHARM REV CODE 250: Performed by: NURSE ANESTHETIST, CERTIFIED REGISTERED

## 2024-07-26 PROCEDURE — 63600175 PHARM REV CODE 636 W HCPCS: Performed by: NURSE ANESTHETIST, CERTIFIED REGISTERED

## 2024-07-26 PROCEDURE — 37000009 HC ANESTHESIA EA ADD 15 MINS: Performed by: STUDENT IN AN ORGANIZED HEALTH CARE EDUCATION/TRAINING PROGRAM

## 2024-07-26 PROCEDURE — 37000008 HC ANESTHESIA 1ST 15 MINUTES: Performed by: STUDENT IN AN ORGANIZED HEALTH CARE EDUCATION/TRAINING PROGRAM

## 2024-07-26 RX ORDER — GLUCAGON 1 MG
1 KIT INJECTION
Status: DISCONTINUED | OUTPATIENT
Start: 2024-07-26 | End: 2024-07-26 | Stop reason: HOSPADM

## 2024-07-26 RX ORDER — ACETAMINOPHEN 10 MG/ML
INJECTION, SOLUTION INTRAVENOUS
Status: DISCONTINUED | OUTPATIENT
Start: 2024-07-26 | End: 2024-07-26

## 2024-07-26 RX ORDER — VITAMIN A 3000 MCG
1 CAPSULE ORAL 3 TIMES DAILY
Qty: 44 ML | Refills: 12 | Status: SHIPPED | OUTPATIENT
Start: 2024-07-26

## 2024-07-26 RX ORDER — FENTANYL CITRATE 50 UG/ML
INJECTION, SOLUTION INTRAMUSCULAR; INTRAVENOUS
Status: DISCONTINUED | OUTPATIENT
Start: 2024-07-26 | End: 2024-07-26

## 2024-07-26 RX ORDER — ROCURONIUM BROMIDE 10 MG/ML
INJECTION, SOLUTION INTRAVENOUS
Status: DISCONTINUED | OUTPATIENT
Start: 2024-07-26 | End: 2024-07-26

## 2024-07-26 RX ORDER — LIDOCAINE HYDROCHLORIDE 20 MG/ML
INJECTION, SOLUTION EPIDURAL; INFILTRATION; INTRACAUDAL; PERINEURAL
Status: DISCONTINUED | OUTPATIENT
Start: 2024-07-26 | End: 2024-07-26

## 2024-07-26 RX ORDER — BACITRACIN ZINC 500 UNIT/G
OINTMENT (GRAM) TOPICAL
Status: DISCONTINUED | OUTPATIENT
Start: 2024-07-26 | End: 2024-07-26 | Stop reason: HOSPADM

## 2024-07-26 RX ORDER — OXYMETAZOLINE HCL 0.05 %
SPRAY, NON-AEROSOL (ML) NASAL
Status: DISCONTINUED | OUTPATIENT
Start: 2024-07-26 | End: 2024-07-26 | Stop reason: HOSPADM

## 2024-07-26 RX ORDER — PHENYLEPHRINE HYDROCHLORIDE 10 MG/ML
INJECTION INTRAVENOUS
Status: DISCONTINUED | OUTPATIENT
Start: 2024-07-26 | End: 2024-07-26

## 2024-07-26 RX ORDER — ONDANSETRON HYDROCHLORIDE 2 MG/ML
INJECTION, SOLUTION INTRAVENOUS
Status: DISCONTINUED | OUTPATIENT
Start: 2024-07-26 | End: 2024-07-26

## 2024-07-26 RX ORDER — DEXAMETHASONE SODIUM PHOSPHATE 4 MG/ML
INJECTION, SOLUTION INTRA-ARTICULAR; INTRALESIONAL; INTRAMUSCULAR; INTRAVENOUS; SOFT TISSUE
Status: DISCONTINUED | OUTPATIENT
Start: 2024-07-26 | End: 2024-07-26

## 2024-07-26 RX ORDER — LIDOCAINE HYDROCHLORIDE AND EPINEPHRINE 10; 10 MG/ML; UG/ML
INJECTION, SOLUTION INFILTRATION; PERINEURAL
Status: DISCONTINUED | OUTPATIENT
Start: 2024-07-26 | End: 2024-07-26 | Stop reason: HOSPADM

## 2024-07-26 RX ORDER — ATENOLOL 25 MG/1
25 TABLET ORAL 2 TIMES DAILY
Status: DISCONTINUED | OUTPATIENT
Start: 2024-07-26 | End: 2024-07-26 | Stop reason: HOSPADM

## 2024-07-26 RX ORDER — PROPOFOL 10 MG/ML
VIAL (ML) INTRAVENOUS
Status: DISCONTINUED | OUTPATIENT
Start: 2024-07-26 | End: 2024-07-26

## 2024-07-26 RX ADMIN — FENTANYL CITRATE 50 MCG: 50 INJECTION, SOLUTION INTRAMUSCULAR; INTRAVENOUS at 09:07

## 2024-07-26 RX ADMIN — SODIUM CHLORIDE: 0.9 INJECTION, SOLUTION INTRAVENOUS at 09:07

## 2024-07-26 RX ADMIN — ATENOLOL 25 MG: 25 TABLET ORAL at 03:07

## 2024-07-26 RX ADMIN — PROPOFOL 50 MG: 10 INJECTION, EMULSION INTRAVENOUS at 09:07

## 2024-07-26 RX ADMIN — Medication 2 G: at 09:07

## 2024-07-26 RX ADMIN — ROCURONIUM BROMIDE 50 MG: 10 INJECTION, SOLUTION INTRAVENOUS at 09:07

## 2024-07-26 RX ADMIN — PROPOFOL 50 MG: 10 INJECTION, EMULSION INTRAVENOUS at 10:07

## 2024-07-26 RX ADMIN — SUGAMMADEX 200 MG: 100 INJECTION, SOLUTION INTRAVENOUS at 11:07

## 2024-07-26 RX ADMIN — PROPOFOL 150 MG: 10 INJECTION, EMULSION INTRAVENOUS at 09:07

## 2024-07-26 RX ADMIN — ACETAMINOPHEN 1000 MG: 10 INJECTION, SOLUTION INTRAVENOUS at 10:07

## 2024-07-26 RX ADMIN — DEXAMETHASONE SODIUM PHOSPHATE 8 MG: 4 INJECTION, SOLUTION INTRAMUSCULAR; INTRAVENOUS at 09:07

## 2024-07-26 RX ADMIN — LIDOCAINE HYDROCHLORIDE 80 MG: 20 INJECTION, SOLUTION EPIDURAL; INFILTRATION; INTRACAUDAL; PERINEURAL at 09:07

## 2024-07-26 RX ADMIN — ONDANSETRON 4 MG: 2 INJECTION INTRAMUSCULAR; INTRAVENOUS at 10:07

## 2024-07-26 RX ADMIN — PHENYLEPHRINE HYDROCHLORIDE 100 MCG: 10 INJECTION INTRAVENOUS at 10:07

## 2024-07-26 NOTE — DISCHARGE INSTRUCTIONS
1.Wound care  Primary  Clean around yellow gauze dressing as needed with half strength hydrogen peroxide and water  Apply ointment to skin and yellow gauze dressing  Yellow gauze dressing will be removed at first post-op visit or at time of surgery  If more comfortable, can cover area with gauze or other dressing  Keep yellow gauze clean and dry    Neck  Apply ointment to sutures behind ear or neck 2-3 times/day     2. Take Tylenol or Motrin as needed for pain. These medicines work in different ways and can be taken together - just space them out so you can always take something.    3. You can eat and drink whatever you like, but it is very common to be nauseous the first day after surgery.    4. Wait 24 hours before getting the incisions in the neck behind the ear wet. Try to keep the yellow dressing dry until you are seen in clinic, although you can clean around the area gently with soap and water.    5. Call the office if you experience worsening pain, redness, swelling around the area. Go to the emergency room if you have trouble breathing or are unable to drink anything.        Turbinate Reduction     Turbinates are small structures inside the nose that warm, filter and humidify the air that passes through the nostrils into the lungs. They are made by a bony structure surrounded by vascular tissue and a mucous membrane outside, and can become swollen and inflamed by allergies, irritation or infection, causing nasal obstruction and congestion.    Turbinate reduction is a procedure in which a needle-like instrument is inserted into the turbinate and energy is transmitted to the tissue to cause a controlled damage, so by the time healing process occurs, the turbinates will be reduced, allowing improved airflow through the nose. It is done under general anesthesia, and it takes about 10 minutes.         Post-Operative   The patient should be aware that he/she will have crusting on the nose for approximately 3  weeks. We recommend frequent use of nasal saline spray three times per day during this period of time. Stop using your flonase for next month while it heals.

## 2024-07-26 NOTE — BRIEF OP NOTE
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 EBL = 20cc.         Specimens:   Specimen (24h ago, onward)      None            [id number]          Discharge Note    SUMMARY     Admit Date: 7/26/2024    Discharge Date and Time:  07/26/2024 11:21 AM    Hospital Course (synopsis of major diagnoses, care, treatment, and services provided during the course of the hospital stay): Patient underwent surgery and was discharged successfully.    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 Take Tylenol or Motrin as needed for pain.  These medicines work in different ways and can be taken together - just space them out so you can always take something.    3. You can eat and drink whatever you like, but it is  very common to be nauseous the first day after surgery.    4. Wait 24 hours before getting the incisions in the neck behind the ear wet. Try to keep the yellow dressing dry until you are seen in clinic, although you can clean around the area gently with soap and water.    5. Call the office if you experience worsening pain, redness, swelling around the area. Go to the emergency room if you have trouble breathing or are unable to drink anything.     Medications:  Reconciled Home Medications:      Medication List        CONTINUE taking these medications      albuterol 90 mcg/actuation inhaler  Commonly known as: PROAIR HFA  Inhale 2 puffs into the lungs every 6 (six) hours as needed for Wheezing. Rescue     allopurinoL 300 MG tablet  Commonly known as: ZYLOPRIM  Take 1 tablet (300 mg total) by mouth once daily.     atenoloL 25 MG tablet  Commonly known as: TENORMIN  Take 1 tablet (25 mg total) by mouth once daily.     fluorouraciL 5 % cream  Commonly known as: EFUDEX  APPLY  CREAM TOPICALLY TO AFFECTED AREA ON FACE  TWICE DAILY FOR 2 TO 3 WEEKS , STOP IF BLISTERED, OOZING OR BLEEDING. USE SUN PROTECTION     furosemide 20 MG tablet  Commonly known as: LASIX  Take one daily as directed for fluid accumulation     IMMUNE SUPPORT ORAL  Take by mouth.     multivitamin per tablet  Commonly known as: THERAGRAN  Take by mouth.     mupirocin 2 % ointment  Commonly known as: BACTROBAN  Apply to affected area 2 times daily     PREVIDENT 5000 ENAMEL PROTECT 1.1-5 % Pste  Generic drug: sodium fluoride-pot nitrate  SMARTSIG:To Teeth     triamcinolone acetonide 0.1% 0.1 % ointment  Commonly known as: KENALOG  AAA on lower legs BID PRN for flares; Due for annual with PCP, Labs            STOP taking these medications      neomycin-polymyxin-hydrocortisone 3.5-10,000-1 mg/mL-unit/mL-% otic suspension  Commonly known as: CORTISPORIN            No discharge procedures on file.   Follow-up Information       Irene Martinez MD Follow up in 1  week(s).    Specialty: Otolaryngology  Why: post op  Contact information:  [address] [place] [address]  [phone]

## 2024-07-26 NOTE — TRANSFER OF CARE
Anesthesia Transfer of Care Note    Patient: Derik Wolfe    Procedure(s) Performed: Procedure(s) (LRB):  CLOSURE, MOHS PROCEDURE DEFECT (N/A)  APPLICATION, GRAFT, SKIN, FULL-THICKNESS  REDUCTION, NASAL TURBINATE    Patient location: PACU    Anesthesia Type: general    Transport from OR: Transported from OR on 6-10 L/min O2 by face mask with adequate spontaneous ventilation    Post pain: adequate analgesia    Post assessment: no apparent anesthetic complications and tolerated procedure well    Post vital signs: stable    Level of consciousness: awake and alert    Nausea/Vomiting: no nausea/vomiting    Complications: none    Transfer of care protocol was followed      Last vitals:

## 2024-07-26 NOTE — PROGRESS NOTES
Patient's HR in the 70s, reading afib on the monitor. Spoke with Dr River to assess patient. Patient asymptomatic. Verbalizes no complaints.

## 2024-07-26 NOTE — ASSESSMENT & PLAN NOTE
Patient with Paroxysmal (<7 days) atrial fibrillation which is controlled currently with Beta Blocker. Patient is currently in atrial fibrillation.[id number] Score: 3 (+2 age, +1 HTN). HASBLED Score: 2. Anticoagulation not indicated due to new onset afib, recent surgery .    Post-op Afib, asymptomatic. HR in 70s. Likely transient 2/2 surgery or oxymetazoline. On atenolol 25mg at home. Not anticoagulated.     Recommendations:  [ ] follow-up next Tuesday (7/30/24) at [facility] cardiology clinic for EKG, Dr. Barajas to organize  [ ] if in persistent Afib on follow-up, will initiate long-term anticoagulation  [ ] extra dose of atenolol given for hypertension (190s systolic), changed frequency to BID  [ ] f/u PCP for HTN management

## 2024-07-26 NOTE — ANESTHESIA POSTPROCEDURE EVALUATION
Anesthesia Post Evaluation    Patient: Derik Wolfe    Procedure(s) Performed: Procedure(s) (LRB):  CLOSURE, MOHS PROCEDURE DEFECT (N/A)  APPLICATION, GRAFT, SKIN, FULL-THICKNESS  REDUCTION, NASAL TURBINATE    Final Anesthesia Type: general      Patient location during evaluation: PACU  Patient participation: Yes- Able to Participate  Level of consciousness: awake and alert  Post-procedure vital signs: reviewed and stable  Pain management: adequate  Airway patency: patent    PONV status at discharge: No PONV  Anesthetic complications: yes  Perioperative Events: other periop events (see comments)        Cardiovascular status: blood pressure returned to baseline  Respiratory status: unassisted  Hydration status: euvolemic  Follow-up not needed.  Comments: In PACU had a fib supported by ECG. Cardiology consulted. No acute treatment. Follow up with their service              Vitals Value Taken Time   /81 07/26/24 1546   Temp 36.6 °C (97.8 °F) 07/26/24 1545   Pulse 83 07/26/24 1549   Resp 14 07/26/24 1549   SpO2 96 % 07/26/24 1549   Vitals shown include unfiled device data.      No case tracking events are documented in the log.      Pain/Carlos Score: Carlos Score: 9 (7/26/2024 11:45 AM)

## 2024-07-26 NOTE — PLAN OF CARE
Patient with new onset Afib in PACU. Denied CP, SOB, or palpitations. Cardiology consulted and cleared patient for discharge. He will take his home atenolol BID and will fu next week for repeat EKG in cards clinic.     No complaints or questions on repeat interview. Stable for discharge.    Kristofer Sigala MD  ENT PGY4

## 2024-07-26 NOTE — SUBJECTIVE & OBJECTIVE
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     Current Outpatient Medications on File Prior to Encounter   Medication Sig    allopurinoL (ZYLOPRIM) 300 MG tablet Take 1 tablet (300 mg total) by mouth once daily.    atenoloL (TENORMIN) 25 MG tablet Take 1 tablet (25 mg total) by mouth once daily.    PREVIDENT 5000 ENAMEL PROTECT 1.1-5 % Pste SMARTSIG:To Teeth    albuterol (PROAIR HFA) 90 mcg/actuation inhaler Inhale 2 puffs into the lungs every 6 (six) hours as needed for Wheezing. Rescue    fluorouraciL (EFUDEX) 5 % cream APPLY  CREAM TOPICALLY TO AFFECTED AREA ON FACE  TWICE DAILY FOR 2 TO 3 WEEKS , STOP IF BLISTERED, OOZING OR BLEEDING. USE SUN PROTECTION    furosemide (LASIX) 20 MG tablet Take one daily as directed for fluid accumulation    multivitamin (THERAGRAN) per tablet Take by mouth.    mupirocin (BACTROBAN) 2 % ointment Apply to affected area 2 times daily    mv-min/vit C/glut/lysine/hb124 (IMMUNE SUPPORT ORAL) Take by mouth.    triamcinolone acetonide 0.1% (KENALOG) 0.1 % ointment AAA on lower legs BID PRN for flares; Due for annual with PCP, Labs    [DISCONTINUED] neomycin-polymyxin-hydrocortisone (CORTISPORIN) 3.5-10,000-1 mg/mL-unit/mL-% otic suspension Place 3 drops into the left ear 4 (four) times daily. (Patient not taking:  Reported on 7/25/2024)     Family History    None       Tobacco Use    Smoking status: Former     Passive exposure: Never    Smokeless tobacco: Never    Tobacco comments:     quit in 1980's   Substance and Sexual Activity    Alcohol use: Yes     Alcohol/week: 1.0 standard drink of alcohol     Types: 1 Cans of beer per week    Drug use: No    Sexual activity: Yes     Partners: Female     Review of Systems   Cardiovascular:  Negative for chest pain, irregular heartbeat, leg swelling, palpitations and syncope.   Respiratory:  Negative for shortness of breath.    Skin:  Positive for skin cancer.   Neurological:  Negative for dizziness, headaches and light-headedness.     Objective:     Vital Signs (Most Recent):  Temp: 97.9 °F (36.6 °C) (07/26/24 0810)  Pulse: 73 (07/26/24 1415)  Resp: 16 (07/26/24 1415)  BP: (!) 161/72 (07/26/24 1415)  SpO2: 95 % (07/26/24 1415) Vital Signs (24h Range):  Temp:  [97.9 °F (36.6 °C)] 97.9 °F (36.6 °C)  Pulse:  [71-78] 73  Resp:  [7-20] 16  SpO2:  [94 %-100 %] 95 %  BP: (154-175)/(72-93) 161/72     Weight: 100.2 kg (221 lb)  Body mass index is 30.82 kg/m².    SpO2: 95 %         Intake/Output Summary (Last 24 hours) at 7/26/2024 1426  Last data filed at 7/26/2024 1123  Gross per 24 hour   Intake 1600 ml   Output --   Net 1600 ml       Lines/Drains/Airways       Peripheral Intravenous Line  Duration                  Peripheral IV - Single Lumen 07/26/24 0844 20 G Anterior;Right Forearm <1 day                     Physical Exam  Constitutional:       General: He is not in acute distress.     Appearance: He is obese. He is not ill-appearing.   HENT:      Nose:      Comments: Post op dressing in place  Cardiovascular:      Rate and Rhythm: Normal rate. Rhythm irregular.      Pulses: Normal pulses.      Heart sounds: Normal heart sounds. No murmur heard.     No gallop.   Pulmonary:      Effort: Pulmonary effort is normal. No respiratory distress.      Breath sounds: Normal breath sounds. No  wheezing or rales.   Musculoskeletal:      Right lower le+ Pitting Edema present.      Left lower le+ Pitting Edema present.   Skin:     General: Skin is warm and dry.      Capillary Refill: Capillary refill takes less than 2 seconds.   Neurological:      Mental Status: He is alert and oriented to person, place, and time.          Significant Labs: No labs to report during this encounter

## 2024-07-26 NOTE — PLAN OF CARE
Patient is stable and ready for discharge. Instructions and prescription given to patient and family. Questions answered. Patient tolerating po liquids with no difficulty. Patient has no pain or states it is a tolerable level for them. Anesthesia consent and surgical consent in chart.    Cardiology and anesthesia verbalized consent to discharge patient. Patient to follow up with Cardiology to obtain outpatient EKG. Pt advised to contact primary care physician.

## 2024-07-26 NOTE — CONSULTS
[facility] - Surgery ([facility])  Cardiology  Consult Note    Patient Name: Derik Wolfe  MRN: [id number]  Admission Date: 7/26/2024  Hospital Length of Stay: 0 days  Code Status: No Order   Attending Provider: Irene Martinez MD   Consulting Provider: Alvarez Camargo MD  Primary Care Physician: Girish Seals MD  Principal Problem:Mohs defect    Patient information was obtained from patient, spouse/SO, past medical records, and primary team.     Inpatient consult to Cardiology  Consult performed by: Alvarez Camargo MD  Consult ordered by: Kristofer Sigala MD  Reason for consult: New onset Afib after surgery. Denies SOB, CP, and palpitations        Subjective:     Chief Complaint:  Atrial fibrillation post-op     HPI:   89yo M w/ PMHx of HTN, gout, obesity, SCC, and melanoma of the nose found to be in Afib following skin graft and nasal turbinate reduction by ENT. Patient asymptomatic, denies chest pain, SOB, palpitations. No prior cardiac history. Takes atenolol 25mg for HTN, took this AM. He received topical oxymetazoline via soaked cotton pads following surgery completion.     Cardiology consulted for post-op Afib.     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     Current Outpatient Medications on File Prior to Encounter   Medication Sig     allopurinoL (ZYLOPRIM) 300 MG tablet Take 1 tablet (300 mg total) by mouth once daily.    atenoloL (TENORMIN) 25 MG tablet Take 1 tablet (25 mg total) by mouth once daily.    PREVIDENT 5000 ENAMEL PROTECT 1.1-5 % Pste SMARTSIG:To Teeth    albuterol (PROAIR HFA) 90 mcg/actuation inhaler Inhale 2 puffs into the lungs every 6 (six) hours as needed for Wheezing. Rescue    fluorouraciL (EFUDEX) 5 % cream APPLY  CREAM TOPICALLY TO AFFECTED AREA ON FACE  TWICE DAILY FOR 2 TO 3 WEEKS , STOP IF BLISTERED, OOZING OR BLEEDING. USE SUN PROTECTION    furosemide (LASIX) 20 MG tablet Take one daily as directed for fluid accumulation    multivitamin (THERAGRAN) per tablet Take by mouth.    mupirocin (BACTROBAN) 2 % ointment Apply to affected area 2 times daily    mv-min/vit C/glut/lysine/hb124 (IMMUNE SUPPORT ORAL) Take by mouth.    triamcinolone acetonide 0.1% (KENALOG) 0.1 % ointment AAA on lower legs BID PRN for flares; Due for annual with PCP, Labs    [DISCONTINUED] neomycin-polymyxin-hydrocortisone (CORTISPORIN) 3.5-10,000-1 mg/mL-unit/mL-% otic suspension Place 3 drops into the left ear 4 (four) times daily. (Patient not taking: Reported on 7/25/2024)     Family History    None       Tobacco Use    Smoking status: Former     Passive exposure: Never    Smokeless tobacco: Never    Tobacco comments:     quit in 1980's   Substance and Sexual Activity    Alcohol use: Yes     Alcohol/week: 1.0 standard drink of alcohol     Types: 1 Cans of beer per week    Drug use: No    Sexual activity: Yes     Partners: Female     Review of Systems   Cardiovascular:  Negative for chest pain, irregular heartbeat, leg swelling, palpitations and syncope.   Respiratory:  Negative for shortness of breath.    Skin:  Positive for skin cancer.   Neurological:  Negative for dizziness, headaches and light-headedness.     Objective:     Vital Signs (Most Recent):  Temp: 97.9 °F (36.6 °C) (07/26/24 0810)  Pulse: 73 (07/26/24 1415)  Resp: 16 (07/26/24  1415)  BP: (!) 161/72 (24 1415)  SpO2: 95 % (24 1415) Vital Signs (24h Range):  Temp:  [97.9 °F (36.6 °C)] 97.9 °F (36.6 °C)  Pulse:  [71-78] 73  Resp:  [7-20] 16  SpO2:  [94 %-100 %] 95 %  BP: (154-175)/(72-93) 161/72     Weight: 100.2 kg (221 lb)  Body mass index is 30.82 kg/m².    SpO2: 95 %         Intake/Output Summary (Last 24 hours) at 2024 1426  Last data filed at 2024 1123  Gross per 24 hour   Intake 1600 ml   Output --   Net 1600 ml       Lines/Drains/Airways       Peripheral Intravenous Line  Duration                  Peripheral IV - Single Lumen 24 0844 20 G Anterior;Right Forearm <1 day                     Physical Exam  Constitutional:       General: He is not in acute distress.     Appearance: He is obese. He is not ill-appearing.   HENT:      Nose:      Comments: Post op dressing in place  Cardiovascular:      Rate and Rhythm: Normal rate. Rhythm irregular.      Pulses: Normal pulses.      Heart sounds: Normal heart sounds. No murmur heard.     No gallop.   Pulmonary:      Effort: Pulmonary effort is normal. No respiratory distress.      Breath sounds: Normal breath sounds. No wheezing or rales.   Musculoskeletal:      Right lower le+ Pitting Edema present.      Left lower le+ Pitting Edema present.   Skin:     General: Skin is warm and dry.      Capillary Refill: Capillary refill takes less than 2 seconds.   Neurological:      Mental Status: He is alert and oriented to person, place, and time.          Significant Labs: No labs to report during this encounter    Assessment and Plan:     Atrial fibrillation  Patient with Paroxysmal (<7 days) atrial fibrillation which is controlled currently with Beta Blocker. Patient is currently in atrial fibrillation.[id number] Score: 3 (+2 age, +1 HTN). HASBLED Score: 2. Anticoagulation not indicated due to new onset afib, recent surgery .    Post-op Afib, asymptomatic. HR in 70s. Likely transient 2/2 surgery or oxymetazoline.  On atenolol 25mg at home. Not anticoagulated.     Recommendations:  [ ] follow-up next Tuesday (7/30/24) at [facility] [place] for EKG, Dr. Barajas to organize  [ ] if in persistent Afib on follow-up, will initiate long-term anticoagulation  [ ] extra dose of atenolol given for hypertension (190s systolic), changed frequency to BID  [ ] f/u PCP for HTN management      Please await attending attestation for final recommendations. If there are any additional questions, please reach out to the cardiology fellow on call.     VTE Risk Mitigation (From admission, onward)      None            Thank you for your consult. I will sign off. Please contact us if you have any additional questions.    Alvarez Camargo MD  Cardiology   [facility] - Surgery (2nd Fl)

## 2024-07-26 NOTE — ANESTHESIA PREPROCEDURE EVALUATION
07/26/2024  Derik Wolfe is a 88 y.o., male FOR MOHs surgery.     Patient Active Problem List   Diagnosis    Hypertension           Pre-op Assessment    I have reviewed the Patient Summary Reports.     I have reviewed the Nursing Notes. I have reviewed the NPO Status.   I have reviewed the Medications.     Review of Systems  Social:  No Alcohol Use, Non-Smoker       Cardiovascular:     Hypertension                                        Renal/:  Chronic Renal Disease                Neurological:  Neurology Normal                                          Physical Exam  General: Well nourished    Airway:  Mallampati: I / I  Mouth Opening: Normal  TM Distance: Normal  Tongue: Normal  Neck ROM: Normal ROM    Dental:  Intact        Anesthesia Plan  Type of Anesthesia, risks & benefits discussed:    Anesthesia Type: Gen ETT  Intra-op Monitoring Plan: Standard ASA Monitors  Post Op Pain Control Plan: multimodal analgesia  Induction:  IV  Airway Plan: Direct  Informed Consent: Informed consent signed with the Patient and all parties understand the risks and agree with anesthesia plan.  All questions answered. Patient consented to blood products? Yes  ASA Score: 3  Day of Surgery Review of History & Physical: H&P Update referred to the surgeon/provider.    Ready For Surgery From Anesthesia Perspective.     .    Lab Results   Component Value Date    WBC 7.62 06/04/2024    HGB 16.7 06/04/2024    HCT 51.1 06/04/2024    MCV 94 06/04/2024     06/04/2024       BMP  Lab Results   Component Value Date     06/04/2024    K 3.9 06/04/2024     06/04/2024    CO2 28 06/04/2024    BUN 11 06/04/2024    CREATININE 0.7 06/04/2024    CALCIUM 10.1 06/04/2024    ANIONGAP 10 06/04/2024    EGFRNORACEVR >60.0 06/04/2024

## 2024-07-26 NOTE — ANESTHESIA PROCEDURE NOTES
Intubation    Date/Time: 7/26/2024 9:25 AM    Performed by: Taqueria Kim CRNA  Authorized by: Cristo River MD    Intubation:     Induction:  Intravenous    Intubated:  Postinduction    Mask Ventilation:  Easy mask    Attempts:  1    Attempted By:  CRNA    Method of Intubation:  Video laryngoscopy    Blade:  Cosme 3    Laryngeal View Grade: Grade I - full view of cords      Difficult Airway Encountered?: No      Complications:  None    Airway Device:  Oral endotracheal tube    Airway Device Size:  7.5    Style/Cuff Inflation:  Cuffed (inflated to minimal occlusive pressure)    Tube secured:  23    Secured at:  The lips    Placement Verified By:  Capnometry    Complicating Factors:  None    Findings Post-Intubation:  BS equal bilateral and atraumatic/condition of teeth unchanged

## 2024-07-26 NOTE — ASSESSMENT & PLAN NOTE
Patient with Paroxysmal (<7 days) atrial fibrillation which is controlled currently with Beta Blocker. Patient is currently in atrial fibrillation.[id number] Score: 3 (+2 age, +1 HTN). HASBLED Score: 2. Anticoagulation not indicated due to new onset afib, recent surgery .    Post-op Afib, asymptomatic. HR in 70s. Likely transient 2/2 surgery or oxymetazoline. On atenolol 25mg at home. Not anticoagulated.     Recommendations:  [ ] [place] (7[place]30/24[place] [facility] [place], Dr. Barajas to organize  [ ] if in persistent Afib on follow-up, will initiate long-term anticoagulation  [ ] extra dose of atenolol given for hypertension (190s systolic)  [ ] f/u PCP for HTN management

## 2024-07-26 NOTE — H&P
[facility] - Surgery ([facility])  Otorhinolaryngology-Head & Neck Surgery  History & Physical    Patient Name: Derik Wolfe  MRN: [id number]  Admission Date: 7/26/2024  Attending Physician: Kristofer Sigala MD  Primary Care Provider: Girish Seals MD    Patient information was obtained from patient and past medical records.     Subjective:     Chief Complaint/Reason for Admission: Mohs defect repair    History of Present Illness:  88 y.o. male presents with right nasal defect s/p Mohs. He is here today for reconstruction. Discussed options with patient and wife at bedside in pre-op - primarily skin graft vs. PMFF. Patient elects for skin graft. Also reporting nasal obstruction, therefore will add inferior turbinate reduction and out fracture. No other acute complaints    No current facility-administered medications on file prior to encounter.     Current Outpatient Medications on File Prior to Encounter   Medication Sig    allopurinoL (ZYLOPRIM) 300 MG tablet Take 1 tablet (300 mg total) by mouth once daily.    atenoloL (TENORMIN) 25 MG tablet Take 1 tablet (25 mg total) by mouth once daily.    PREVIDENT 5000 ENAMEL PROTECT 1.1-5 % Pste SMARTSIG:To Teeth    albuterol (PROAIR HFA) 90 mcg/actuation inhaler Inhale 2 puffs into the lungs every 6 (six) hours as needed for Wheezing. Rescue    fluorouraciL (EFUDEX) 5 % cream APPLY  CREAM TOPICALLY TO AFFECTED AREA ON FACE  TWICE DAILY FOR 2 TO 3 WEEKS , STOP IF BLISTERED, OOZING OR BLEEDING. USE SUN PROTECTION    furosemide (LASIX) 20 MG tablet Take one daily as directed for fluid accumulation    multivitamin (THERAGRAN) per tablet Take by mouth.    mupirocin (BACTROBAN) 2 % ointment Apply to affected area 2 times daily    mv-min/vit C/glut/lysine/hb124 (IMMUNE SUPPORT ORAL) Take by mouth.    neomycin-polymyxin-hydrocortisone (CORTISPORIN) 3.5-10,000-1 mg/mL-unit/mL-% otic suspension Place 3 drops into the left ear 4 (four) times daily. (Patient not taking: Reported on  7/25/2024)    triamcinolone acetonide 0.1% (KENALOG) 0.1 % ointment AAA on lower legs BID PRN for flares; Due for annual with PCP, Labs       Review of patient's allergies indicates:   Allergen Reactions    Codeine Other (See Comments) and Hives     Other reaction(s): Stomach upset       Past Medical History:   Diagnosis Date    AK (actinic keratosis)     arms and forehead treated with efudex    Calcium oxalate renal stones     Gout flare     Hypertension     Melanoma of nose 07/23/2024    nasal tip    Obesity     SCC (squamous cell carcinoma) excised 8/10/18    R upper back    Squamous cell carcinoma 2014    right upper nose     Past Surgical History:   Procedure Laterality Date    KNEE ARTHROSCOPY       Family History    None       Tobacco Use    Smoking status: Former     Passive exposure: Never    Smokeless tobacco: Never    Tobacco comments:     quit in 1980's   Substance and Sexual Activity    Alcohol use: Yes     Alcohol/week: 1.0 standard drink of alcohol     Types: 1 Cans of beer per week    Drug use: No    Sexual activity: Yes     Partners: Female     Review of Systems  Objective:     Vital Signs (Most Recent):  Temp: 97.9 °F (36.6 °C) (07/26/24 0810)  Pulse: 73 (07/26/24 0810)  Resp: 18 (07/26/24 0810)  BP: (!) 156/76 (07/26/24 0810)  SpO2: 99 % (07/26/24 0810) Vital Signs (24h Range):  Temp:  [97.9 °F (36.6 °C)] 97.9 °F (36.6 °C)  Pulse:  [73] 73  Resp:  [18] 18  SpO2:  [99 %] 99 %  BP: (156)/(76) 156/76     Weight: 100.2 kg (221 lb)  Body mass index is 30.82 kg/m².    Physical Exam  NAD, alert and oriented  Normal WOB, no stridor or stertor, speaking in full sentences  Right nasal defect involving nasal tip, ala, and soft tissue triangle - lower lateral cartilage partially visible but has good integrity, no external valve collapse at rest. Defect is not through and through.   Symmetric chest rise    Significant Labs:  All pertinent labs from the last 24 hours have been reviewed.    Significant  Diagnostics:  I have reviewed all pertinent imaging results/findings within the past 24 hours.    Assessment/Plan:     Nasal defect s/p mohs surgery for melanoma    To OR today for repair with FTSG and inferior turbinate reduction. Anticipate outpatient surgery.    Kristofer Sigala MD  ENT PGY4

## 2024-07-26 NOTE — HPI
87yo M w/ PMHx of HTN, gout, obesity, SCC, and melanoma of the nose found to be in Afib following skin graft and nasal turbinate reduction by ENT. Patient asymptomatic, denies chest pain, SOB, palpitations. No prior cardiac history. Takes atenolol 25mg for HTN, took this AM. He received topical oxymetazoline via soaked cotton pads following surgery completion.     Cardiology consulted for post-op Afib.

## 2024-07-29 ENCOUNTER — TELEPHONE (OUTPATIENT)
Dept: CARDIOLOGY | Facility: CLINIC | Age: 89
End: 2024-07-29
Payer: MEDICARE

## 2024-07-29 DIAGNOSIS — I48.91 ATRIAL FIBRILLATION, UNSPECIFIED TYPE: Primary | ICD-10-CM

## 2024-07-29 NOTE — OP NOTE
[facility] - Surgery ([facility])  Surgery Department  Operative Note    SUMMARY     Date of Procedure: 7/26/24    Procedure:   - CLOSURE, MOHS PROCEDURE DEFECT of nasal tip - defect size 2.5 x 3.2 cm   - APPLICATION, GRAFT, SKIN, FULL-THICKNESS from right neck to nasal tip  - REDUCTION, NASAL TURBINATE     Pre-Operative Diagnosis: Malignant melanoma of nose [C43.31]    Post-Operative Diagnosis: Post-Op Diagnosis Codes:     * Malignant melanoma of nose [C43.31]    Attending Surgeon(s): Irene Martinez MD        Assistant(s): Kristofer Sigala MD    Anesthesia: General    Description of the Findings of the Procedure: inferior tubinate reduction, full thickness skin graft from right neck to nose in area of Mohs defect     Significant Surgical Tasks Conducted by the Assistant(s), if Applicable: N/A    Indications for Procedure: The patient is an 88 year old man found to have a melanoma of the nasal tip. He underwent slow Mohs procedure with Dr. Case completed 7/24 with negative margins confirmed 7/25. After discussion of risks, benefits, and alternatives, the patient elected to proceed with a full thickness skin graft for reconstruction. We discussed paramedian forehead flap, but the patient greatly preferred a shorter and simpler procedure. The patient also said that he had problems breathing and had been told he needed a septoplasty. He agreed to an inferior turbinate reduction in the hope that this improves his nasal breathing enough to avoid a septoplasty.    Procedure in detail:  The patient was brought into the operating room and placed supine on the operating room table. The correct patient, correct procedure, and correct side were confirmed by all present and surgical timeout was performed. The patient was successfully intubated by anesthesia, and the ETT was secured in place. The bed was rotated 90 degrees.    The nose and neck were cleaned and injected with 1% lidocaine with 1:100,000 epi. Afrin-soaked plegets  were placed in bilateral nasal cavities.  The right ear and face were prepped and draped in the usual fashion.    The procedure began with reduction of inferior turbinates. The plegets were removed from the nose. The patient was noted to have a caudal septal deviation, likely the cause of the nasal congestion. This would likely require an open septoplasty due to the anterior nature of the deviation, and so this was deferred per discussion with patient previously. The turbinates were gently outfractured. The suction electrocautery was then used to reduce the overlying soft tissue the reduce the size of the bilateral turbinates.  Hemostasis was achieved.     Attention was then turned to the nasal tip, where the defect was measured to be 2.5 x 3.2 cm. The wound edges were refreshed sharply.     A right postauricular/neck incision was planned in a skin crease. An ellipse measuring 2.5cm x 5cm was marked. A #15 was used to make an incision down through to the parotid fascia. The skin and a small amount of subcutaneous tissue was removed for the skin graft. The wound was then undermined and closed in layers, with 3-0 Vicryl for deep and deep dermal sutures with a running 4-0 Chromic for the skin sutures.    The skin graft was then cut to the size of the defect and secured on the nasal tip with 4-0 Chromic sutures. A Xeroform bolster was then fashioned to fit the defect and secured in place with 2-0 silk sutures.    At this point the procedure was deemed to be complete. The patient was then returned to the care of anesthesia for emergence and extubation.       Complications: No    Estimated Blood Loss (EBL): 20cc           Implants: * No implants in log *    Specimens:   Specimen (24h ago, onward)      None                    Condition: Good    Disposition: PACU - hemodynamically stable.    Attestation: I was present and scrubbed for the entire procedure.

## 2024-07-29 NOTE — TELEPHONE ENCOUNTER
----- Message from Nica Barajas MD sent at 7/27/2024  9:17 PM CDT -----  Rommel Lee arrange for this patient to get an ECG at main campus on Tuesday. Check first, it may have already been scheduled. Thanks

## 2024-07-30 ENCOUNTER — HOSPITAL ENCOUNTER (OUTPATIENT)
Dept: CARDIOLOGY | Facility: CLINIC | Age: 89
Discharge: HOME | End: 2024-07-30
Payer: MEDICARE

## 2024-07-30 DIAGNOSIS — I48.91 ATRIAL FIBRILLATION, UNSPECIFIED TYPE: ICD-10-CM

## 2024-07-30 LAB
OHS QRS DURATION: 134 MS
OHS QTC CALCULATION: 463 MS

## 2024-07-30 PROCEDURE — 93010 ELECTROCARDIOGRAM REPORT: CPT | Mod: S$PBB,,, | Performed by: STUDENT IN AN ORGANIZED HEALTH CARE EDUCATION/TRAINING PROGRAM

## 2024-07-30 PROCEDURE — 93005 ELECTROCARDIOGRAM TRACING: CPT | Mod: PBBFAC | Performed by: STUDENT IN AN ORGANIZED HEALTH CARE EDUCATION/TRAINING PROGRAM

## 2024-08-02 ENCOUNTER — OFFICE VISIT (OUTPATIENT)
Dept: OTOLARYNGOLOGY | Facility: CLINIC | Age: 89
End: 2024-08-02
Payer: MEDICARE

## 2024-08-02 ENCOUNTER — PATIENT MESSAGE (OUTPATIENT)
Dept: OTOLARYNGOLOGY | Facility: CLINIC | Age: 89
End: 2024-08-02

## 2024-08-02 VITALS
DIASTOLIC BLOOD PRESSURE: 83 MMHG | BODY MASS INDEX: 31.45 KG/M2 | HEART RATE: 73 BPM | WEIGHT: 224.63 LBS | HEIGHT: 71 IN | SYSTOLIC BLOOD PRESSURE: 151 MMHG

## 2024-08-02 DIAGNOSIS — L98.8 MOHS DEFECT: Primary | ICD-10-CM

## 2024-08-02 DIAGNOSIS — Z98.890 MOHS DEFECT: Primary | ICD-10-CM

## 2024-08-02 PROCEDURE — 99999 PR PBB SHADOW E&M-EST. PATIENT-LVL III: CPT | Mod: PBBFAC,,, | Performed by: STUDENT IN AN ORGANIZED HEALTH CARE EDUCATION/TRAINING PROGRAM

## 2024-08-02 PROCEDURE — 99024 POSTOP FOLLOW-UP VISIT: CPT | Mod: POP,,, | Performed by: STUDENT IN AN ORGANIZED HEALTH CARE EDUCATION/TRAINING PROGRAM

## 2024-08-02 PROCEDURE — 99213 OFFICE O/P EST LOW 20 MIN: CPT | Mod: PBBFAC | Performed by: STUDENT IN AN ORGANIZED HEALTH CARE EDUCATION/TRAINING PROGRAM

## 2024-08-02 RX ORDER — CEPHALEXIN 500 MG/1
500 CAPSULE ORAL 4 TIMES DAILY
Qty: 20 CAPSULE | Refills: 0 | Status: SHIPPED | OUTPATIENT
Start: 2024-08-02 | End: 2024-08-07

## 2024-08-09 ENCOUNTER — PATIENT MESSAGE (OUTPATIENT)
Dept: OTOLARYNGOLOGY | Facility: CLINIC | Age: 89
End: 2024-08-09

## 2024-08-09 ENCOUNTER — OFFICE VISIT (OUTPATIENT)
Dept: OTOLARYNGOLOGY | Facility: CLINIC | Age: 89
End: 2024-08-09
Payer: MEDICARE

## 2024-08-09 VITALS
SYSTOLIC BLOOD PRESSURE: 150 MMHG | WEIGHT: 223.13 LBS | DIASTOLIC BLOOD PRESSURE: 75 MMHG | BODY MASS INDEX: 31.12 KG/M2 | HEART RATE: 73 BPM

## 2024-08-09 DIAGNOSIS — Z51.89 VISIT FOR WOUND CHECK: ICD-10-CM

## 2024-08-09 DIAGNOSIS — Z98.890 MOHS DEFECT: ICD-10-CM

## 2024-08-09 DIAGNOSIS — Z98.890 POST-OPERATIVE STATE: Primary | ICD-10-CM

## 2024-08-09 DIAGNOSIS — L98.8 MOHS DEFECT: ICD-10-CM

## 2024-08-09 PROCEDURE — 99999 PR PBB SHADOW E&M-EST. PATIENT-LVL III: CPT | Mod: PBBFAC,,, | Performed by: PHYSICIAN ASSISTANT

## 2024-08-09 PROCEDURE — 99213 OFFICE O/P EST LOW 20 MIN: CPT | Mod: PBBFAC | Performed by: PHYSICIAN ASSISTANT

## 2024-08-20 ENCOUNTER — OFFICE VISIT (OUTPATIENT)
Dept: OTOLARYNGOLOGY | Facility: CLINIC | Age: 89
End: 2024-08-20
Payer: MEDICARE

## 2024-08-20 VITALS
HEART RATE: 74 BPM | SYSTOLIC BLOOD PRESSURE: 151 MMHG | BODY MASS INDEX: 30.93 KG/M2 | WEIGHT: 221.81 LBS | DIASTOLIC BLOOD PRESSURE: 81 MMHG

## 2024-08-20 DIAGNOSIS — Z48.89 ENCOUNTER FOR POSTOPERATIVE WOUND CHECK: Primary | ICD-10-CM

## 2024-08-20 DIAGNOSIS — Z98.890 MOHS DEFECT: ICD-10-CM

## 2024-08-20 DIAGNOSIS — L98.8 MOHS DEFECT: ICD-10-CM

## 2024-08-20 PROCEDURE — 99024 POSTOP FOLLOW-UP VISIT: CPT | Mod: POP,,, | Performed by: PHYSICIAN ASSISTANT

## 2024-08-20 PROCEDURE — 99213 OFFICE O/P EST LOW 20 MIN: CPT | Mod: PBBFAC | Performed by: PHYSICIAN ASSISTANT

## 2024-08-20 PROCEDURE — 99999 PR PBB SHADOW E&M-EST. PATIENT-LVL III: CPT | Mod: PBBFAC,,, | Performed by: PHYSICIAN ASSISTANT

## 2024-08-20 NOTE — PROGRESS NOTES
HEAD AND NECK SURGICAL ONCOLOGY CLINIC NOTE    CC: Follow-up for wound check    TREATMENT HISTORY:  1. CLOSURE, MOHS PROCEDURE DEFECT (N/A), APPLICATION, GRAFT, SKIN, FULL-THICKNESS, REDUCTION, NASAL TURBINATE, July 26, 2024  - Dr Martinez     INTERVAL HISTORY:  Derik Wolfe returns to clinic today for wound check.  He has been cleaning the area with half peroxide and half water. He has been applying aquaphor. Denies any bleeding, drainage, erythema, tenderness.      PHYSICAL EXAM:  Much improved from last visit. Healing well, no signs of infection.   R neck is well healed, no signs of infection.      PROCEDURE:  Wound debrided in the clinic by Dr Martinez. Tolerated well.          PLAN:  Follow up in clinic for post op appt in 2 weeks.  Continue peroxide/water cleaning and applying aquaphor.

## 2024-09-03 ENCOUNTER — OFFICE VISIT (OUTPATIENT)
Dept: OTOLARYNGOLOGY | Facility: CLINIC | Age: 89
End: 2024-09-03
Payer: MEDICARE

## 2024-09-03 VITALS
DIASTOLIC BLOOD PRESSURE: 79 MMHG | SYSTOLIC BLOOD PRESSURE: 148 MMHG | HEART RATE: 69 BPM | BODY MASS INDEX: 31.39 KG/M2 | WEIGHT: 225.06 LBS

## 2024-09-03 DIAGNOSIS — Z48.89 ENCOUNTER FOR POSTOPERATIVE WOUND CHECK: Primary | ICD-10-CM

## 2024-09-03 DIAGNOSIS — L98.8 MOHS DEFECT: ICD-10-CM

## 2024-09-03 DIAGNOSIS — Z98.890 MOHS DEFECT: ICD-10-CM

## 2024-09-03 PROCEDURE — 99024 POSTOP FOLLOW-UP VISIT: CPT | Mod: POP,,, | Performed by: PHYSICIAN ASSISTANT

## 2024-09-03 PROCEDURE — 99213 OFFICE O/P EST LOW 20 MIN: CPT | Mod: PBBFAC | Performed by: PHYSICIAN ASSISTANT

## 2024-09-03 PROCEDURE — 99999 PR PBB SHADOW E&M-EST. PATIENT-LVL III: CPT | Mod: PBBFAC,,, | Performed by: PHYSICIAN ASSISTANT

## 2024-09-03 NOTE — PROGRESS NOTES
HEAD AND NECK SURGICAL ONCOLOGY CLINIC NOTE    CC: Follow-up for wound check    TREATMENT HISTORY:  1. CLOSURE, MOHS PROCEDURE DEFECT (N/A), APPLICATION, GRAFT, SKIN, FULL-THICKNESS, REDUCTION, NASAL TURBINATE, July 26, 2024 - Dr Martinez     INTERVAL HISTORY:  Derik Wolfe returns to clinic today without complaints. He has been cleaning the area with peroxide and water and placing aquaphor three times a day.  There has been some sloughing of the darker colored lesion on the nose. Denies any pain, swelling, bleeding, drainage, tenderness.     PHYSICAL EXAM:  Healing well, no signs of infection. No bleeding, drainage, tenderness. There is some granulation tissue noted.          PLAN:  Follow up in clinic for post op appt in 2 weeks.  Continue wound care.  All questions answered.

## 2024-09-17 ENCOUNTER — OFFICE VISIT (OUTPATIENT)
Dept: OTOLARYNGOLOGY | Facility: CLINIC | Age: 89
End: 2024-09-17
Payer: MEDICARE

## 2024-09-17 VITALS — SYSTOLIC BLOOD PRESSURE: 159 MMHG | HEART RATE: 74 BPM | DIASTOLIC BLOOD PRESSURE: 72 MMHG

## 2024-09-17 DIAGNOSIS — Z48.89 ENCOUNTER FOR POSTOPERATIVE WOUND CHECK: ICD-10-CM

## 2024-09-17 DIAGNOSIS — Z98.890 MOHS DEFECT: Primary | ICD-10-CM

## 2024-09-17 DIAGNOSIS — L98.8 MOHS DEFECT: Primary | ICD-10-CM

## 2024-09-17 PROCEDURE — 99213 OFFICE O/P EST LOW 20 MIN: CPT | Mod: PBBFAC | Performed by: PHYSICIAN ASSISTANT

## 2024-09-17 PROCEDURE — 99024 POSTOP FOLLOW-UP VISIT: CPT | Mod: POP,,, | Performed by: PHYSICIAN ASSISTANT

## 2024-09-17 PROCEDURE — 99999 PR PBB SHADOW E&M-EST. PATIENT-LVL III: CPT | Mod: PBBFAC,,, | Performed by: PHYSICIAN ASSISTANT

## 2024-09-17 RX ORDER — VITAMIN A 3000 MCG
1 CAPSULE ORAL 3 TIMES DAILY
Qty: 44 ML | Refills: 12 | Status: SHIPPED | OUTPATIENT
Start: 2024-09-17

## 2024-09-17 NOTE — PROGRESS NOTES
HEAD AND NECK SURGICAL ONCOLOGY CLINIC NOTE    CC: Follow-up for wound check    TREATMENT HISTORY:  1. CLOSURE, MOHS PROCEDURE DEFECT (N/A), APPLICATION, GRAFT, SKIN, FULL-THICKNESS, REDUCTION, NASAL TURBINATE, July 26, 2024 - Dr Martinez     INTERVAL HISTORY:  Derik Wolfe returns to clinic today for follow up. He has been applying ointment to the area twice a day. Denies any pain, bleeding, drainage, erythema, warmth, tenderness.     PHYSICAL EXAM:  Well healed incision. No signs of infection. He is pleased with the results.            PLAN:  He is pleased with results and does not want any further cosmetic revision.  He is well healed.   Follow up PRN.

## 2024-09-21 ENCOUNTER — PATIENT MESSAGE (OUTPATIENT)
Dept: INTERNAL MEDICINE | Facility: CLINIC | Age: 89
End: 2024-09-21
Payer: MEDICARE

## 2024-09-22 RX ORDER — NEOMYCIN SULFATE, POLYMYXIN B SULFATE AND HYDROCORTISONE 10; 3.5; 1 MG/ML; MG/ML; [USP'U]/ML
3 SUSPENSION/ DROPS AURICULAR (OTIC) 4 TIMES DAILY
Qty: 10 ML | Refills: 1 | Status: SHIPPED | OUTPATIENT
Start: 2024-09-22

## 2024-09-22 NOTE — TELEPHONE ENCOUNTER
Pt requesting med refill for ear wax buildup, pended for your review     LOV with Girish Seals MD , 6/4/2024

## 2024-09-23 ENCOUNTER — CLINICAL SUPPORT (OUTPATIENT)
Dept: DERMATOLOGY | Facility: CLINIC | Age: 89
End: 2024-09-23
Payer: MEDICARE

## 2024-09-23 DIAGNOSIS — L57.0 AK (ACTINIC KERATOSIS): Primary | ICD-10-CM

## 2024-09-23 PROCEDURE — 99999 PR PBB SHADOW E&M-EST. PATIENT-LVL III: CPT | Mod: PBBFAC,,,

## 2024-09-23 PROCEDURE — 96372 THER/PROPH/DIAG INJ SC/IM: CPT | Mod: PBBFAC

## 2024-09-23 PROCEDURE — 99213 OFFICE O/P EST LOW 20 MIN: CPT | Mod: PBBFAC

## 2024-09-23 PROCEDURE — 99999PBSHW PR PBB SHADOW TECHNICAL ONLY FILED TO HB: Mod: PBBFAC,,,

## 2024-09-23 PROCEDURE — 96567 PDT DSTR PRMLG LES SKN: CPT | Mod: PBBFAC

## 2024-09-23 NOTE — PROGRESS NOTES
Photodynamic Therapy Note.     PDT ordered per Dr. Yates     Patient here today for treatment of actinic keratoses using photodynamic therapy.  Risks including but not limited to burning, stinging, redness, swelling, crusting or blistering of the skin of the area treated were discussed with patient.  Patient elects to proceed with photodynamic therapy.     Treatment area:  dorsal bilateral forearms and hands  Treatment area cleaned with rubbing alcohol, 2 Levulan Kerastick (NDC: [id number]) applied evenly to entire surface and allowed to absorb for 60 minutes.  Patient then placed under Bradly-U light for 16 minutes 40 seconds.     Patient tolerated treatment well with only mild but tolerable symptoms of discomfort.  Area washed gently with mild soap and water; Zinc oxide sunscreen applied.  Patient advised to avoid any significant light exposure (sun and artificial) for next 48 hours.     RTC:  In 1 month or sooner if any problems arise.

## 2024-09-23 NOTE — PATIENT INSTRUCTIONS
PHOTODYNAMIC THERAPY POST-OP INSTRUCTIONS      Great Job! You have finished your Bradly-Light treatment. The treatment you have completed will destroy precancerous cells in the skin and reduce your risk of developing skin cancer in the future.    What to expect when you leave the office:  You may notice some redness and swelling similar to a sunburn. This is normal and typical with the Bradly-Light procedure. In some cases, crusting or blistering of the skin that was treated may occur. This also is normal, and is actually a good sign that the treatment is working to destroy cancerous and precancerous cells. These symptoms typically resolve within a few days, but in some cases more robust reactions may last as long as 1-2 weeks.    Daily Instructions:  Your skin will be very sensitive to sunlight for up to 7 days. You will need to wear sunscreen (SPF30 or higher), as well as clothing that will protect your skin from the sun (wide-brimmed hat, long-sleeved shirts, etc). We recommend that you plan to use sunscreen forever! You must avoid direct sunlight for 48 hours following PDT treatment. Window light should also be avoided during the first 48 hours.    At Night:  If your skin is dry/red/tender/crusting, apply Vaseline/petroleum jelly to the area to moisturize the skin. If needed, you may also soak the treatment area in Epson Salt/[place]boro bath for a few minutes to help relieve itching and remove crust or scale.    Itching/ Burning Sensations can be treated by:  Keeping the treatment area covered with a layer of Vaseline/Pretoleum jelly/aquaphor.  Extra strength Tylenol and Benadryl  Epson Salt or [place]boro Soaks    Future Appointments:  Remember while the Bradly-Light treatment greatly reduces your risk of skin cancer and precancerous growths, it does not eliminate the risk completely. It remains important you have close follow-up with your dermatologist.     You should still have regular exams to detect skin cancer as early as  possible and treat precancers not destroyed by the Bradly-Light treatment. A follow up appointment should be made 1 to 3 months following your Bradly-Light treatment.    Call us with any questions or problems at [phone]              POST PDT       Do not pick at the skin while it is peeling.    Use gentle unscented mild cleanser such as cetaphil.   Ibuprofen can be taken as directed for any pain or discomfort.   Do not let skin dry out on the treated area. Apply a thin layer of Aquaphor or    Vaseline jelly   frequently / as needed.   Keep treated skin away from direct sunlight (outside and windows) for 48    hours after treatment.   Apply SPF everyday   Notify our office if you have any questions or concerns at [phone].

## 2024-09-24 ENCOUNTER — DOCUMENTATION ONLY (OUTPATIENT)
Dept: DERMATOLOGY | Facility: CLINIC | Age: 89
End: 2024-09-24
Payer: MEDICARE

## 2024-09-24 NOTE — PROGRESS NOTES
Called patient to check on him post PDT to bilateral dorsal forearms and hands.  He reports that he is doing well with no blisters or pain noted.

## 2024-10-01 DIAGNOSIS — M10.9 GOUT, UNSPECIFIED CAUSE, UNSPECIFIED CHRONICITY, UNSPECIFIED SITE: ICD-10-CM

## 2024-10-01 RX ORDER — ALLOPURINOL 300 MG/1
300 TABLET ORAL DAILY
Qty: 90 TABLET | Refills: 3 | Status: SHIPPED | OUTPATIENT
Start: 2024-10-01

## 2024-10-14 ENCOUNTER — OFFICE VISIT (OUTPATIENT)
Dept: DERMATOLOGY | Facility: CLINIC | Age: 89
End: 2024-10-14
Payer: MEDICARE

## 2024-10-14 DIAGNOSIS — Z85.828 HISTORY OF NONMELANOMA SKIN CANCER: ICD-10-CM

## 2024-10-14 DIAGNOSIS — L57.0 AK (ACTINIC KERATOSIS): ICD-10-CM

## 2024-10-14 DIAGNOSIS — D48.5 NEOPLASM OF UNCERTAIN BEHAVIOR OF SKIN: Primary | ICD-10-CM

## 2024-10-14 DIAGNOSIS — Z85.820 HISTORY OF MALIGNANT MELANOMA: ICD-10-CM

## 2024-10-14 DIAGNOSIS — L82.1 SEBORRHEIC KERATOSES: ICD-10-CM

## 2024-10-14 PROCEDURE — 99213 OFFICE O/P EST LOW 20 MIN: CPT | Mod: 25,S$PBB,, | Performed by: STUDENT IN AN ORGANIZED HEALTH CARE EDUCATION/TRAINING PROGRAM

## 2024-10-14 PROCEDURE — 88305 TISSUE EXAM BY PATHOLOGIST: CPT | Mod: 26,,, | Performed by: PATHOLOGY

## 2024-10-14 PROCEDURE — 11102 TANGNTL BX SKIN SINGLE LES: CPT | Mod: PBBFAC | Performed by: STUDENT IN AN ORGANIZED HEALTH CARE EDUCATION/TRAINING PROGRAM

## 2024-10-14 PROCEDURE — 99213 OFFICE O/P EST LOW 20 MIN: CPT | Mod: PBBFAC | Performed by: STUDENT IN AN ORGANIZED HEALTH CARE EDUCATION/TRAINING PROGRAM

## 2024-10-14 PROCEDURE — 17003 DESTRUCT PREMALG LES 2-14: CPT | Mod: PBBFAC | Performed by: STUDENT IN AN ORGANIZED HEALTH CARE EDUCATION/TRAINING PROGRAM

## 2024-10-14 PROCEDURE — 17003 DESTRUCT PREMALG LES 2-14: CPT | Mod: S$PBB,,, | Performed by: STUDENT IN AN ORGANIZED HEALTH CARE EDUCATION/TRAINING PROGRAM

## 2024-10-14 PROCEDURE — 17000 DESTRUCT PREMALG LESION: CPT | Mod: PBBFAC,XS | Performed by: STUDENT IN AN ORGANIZED HEALTH CARE EDUCATION/TRAINING PROGRAM

## 2024-10-14 PROCEDURE — 88305 TISSUE EXAM BY PATHOLOGIST: CPT | Performed by: PATHOLOGY

## 2024-10-14 PROCEDURE — 17000 DESTRUCT PREMALG LESION: CPT | Mod: S$PBB,XS,, | Performed by: STUDENT IN AN ORGANIZED HEALTH CARE EDUCATION/TRAINING PROGRAM

## 2024-10-14 PROCEDURE — 99999 PR PBB SHADOW E&M-EST. PATIENT-LVL III: CPT | Mod: PBBFAC,,, | Performed by: STUDENT IN AN ORGANIZED HEALTH CARE EDUCATION/TRAINING PROGRAM

## 2024-10-14 PROCEDURE — 11102 TANGNTL BX SKIN SINGLE LES: CPT | Mod: S$PBB,,, | Performed by: STUDENT IN AN ORGANIZED HEALTH CARE EDUCATION/TRAINING PROGRAM

## 2024-10-14 NOTE — PROGRESS NOTES
Subjective:      Patient ID:  Derik Wolfe is a 89 y.o. male who presents for   Chief Complaint   Patient presents with    Skin Check     FBSE     87 yo M here for follow up FBSE here with wife today    Last office visit with me on 6/24/24 for UBSE    Patient with new are of concern:   Location: Attn to lesions on L arm  Duration: became bothersome after PDT  Symptoms: tender  Previous treatments:     Did PDT 4-8-2024 to arms- some areas improved but a lot are still there  PDT 9-    Pertinent history:  History of blistering sunburns: No  History of tanning bed use: No  Family history of melanoma: Possibly, mother had skin cancer on her hands  Personal history of mole removal: Yes  Personal history of skin cancer: Yes- hx of SCC in 2020, 2018, 2014.   SCC excised from L upper arm by Dr. Hager on 10/23/20  SCC excised from R upper back in August 2018.  SCC excised from R upper nose by Dr. Jones on 6/2014.  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 Case and repair Dr. Rowland ENT 7/2024           Review of Systems   Skin:  Positive for activity-related sunscreen use and wears hat. Negative for daily sunscreen use and recent sunburn.   Hematologic/Lymphatic: Does not bruise/bleed easily.       Objective:   Physical Exam   Constitutional: He appears well-developed and well-nourished. No distress.   Neurological: He is alert and oriented to person, place, and time. He is not disoriented.   Psychiatric: He has a normal mood and affect.   Skin:   Areas Examined (abnormalities noted in diagram):   Scalp / Hair Palpated and Inspected  Head / Face Inspection Performed  Neck Inspection Performed  Chest / Axilla Inspection Performed  Abdomen Inspection Performed  Genitals / Buttocks / Groin Inspection Performed  Back Inspection Performed  RUE Inspected  LUE Inspection Performed  RLE Inspected  LLE Inspection Performed  Nails and Digits Inspection Performed                     Diagram Legend     Erythematous  scaling macule/papule c/w actinic keratosis       Vascular papule c/w angioma      Pigmented verrucoid papule/plaque c/w seborrheic keratosis      Yellow umbilicated papule c/w sebaceous hyperplasia      Irregularly shaped tan macule c/w lentigo     1-2 mm smooth white papules consistent with Milia      Movable subcutaneous cyst with punctum c/w epidermal inclusion cyst      Subcutaneous movable cyst c/w pilar cyst      Firm pink to brown papule c/w dermatofibroma      Pedunculated fleshy papule(s) c/w skin tag(s)      Evenly pigmented macule c/w junctional nevus     Mildly variegated pigmented, slightly irregular-bordered macule c/w mildly atypical nevus      Flesh colored to evenly pigmented papule c/w intradermal nevus       Pink pearly papule/plaque c/w basal cell carcinoma      Erythematous hyperkeratotic cursted plaque c/w SCC      Surgical scar with no sign of skin cancer recurrence      Open and closed comedones      Inflammatory papules and pustules      Verrucoid papule consistent consistent with wart     Erythematous eczematous patches and plaques     Dystrophic onycholytic nail with subungual debris c/w onychomycosis     Umbilicated papule    Erythematous-base heme-crusted tan verrucoid plaque consistent with inflamed seborrheic keratosis     Erythematous Silvery Scaling Plaque c/w Psoriasis     See annotation              Assessment / Plan:      Pathology Orders:       Normal Orders This Visit    Specimen to Pathology, Dermatology     Questions:    Procedure Type: Dermatology and skin neoplasms    Number of Specimens: 1    ------------------------: -------------------------    Spec 1 Procedure: Biopsy    Spec 1 Clinical Impression: tender erythematous scaly nodule - r/o scc v FPC v amelanotic melanoma    Spec 1 Source: L dorsal forearm    Release to patient: Immediate    Release to patient:           Neoplasm of uncertain behavior of skin  -     Specimen to Pathology, Dermatology  Shave biopsy procedure  note:    Shave biopsy performed after verbal consent including risk of infection, scar, recurrence, need for additional treatment of site. Area prepped with alcohol, anesthetized with approximately 1.0cc of 1% lidocaine with epinephrine. Lesional tissue shaved with razor blade. Hemostasis achieved with application of aluminum chloride followed by hyfrecation. No complications. Dressing applied. Wound care explained.    AK (actinic keratosis)  Cryosurgery Procedure Note    Verbal consent from the patient is obtained including, but not limited to, risk of hypopigmentation/hyperpigmentation, scar, recurrence of lesion. The patient is aware of the precancerous quality and need for treatment of these lesions. Liquid nitrogen cryosurgery is applied to the 12 actinic keratoses, as detailed in the physical exam, to produce a freeze injury. The patient is aware that blisters may form and is instructed on wound care with gentle cleansing and use of vaseline ointment to keep moist until healed. The patient is supplied a handout on cryosurgery and is instructed to call if lesions do not completely resolve.    Seborrheic keratoses  These are benign inherited growths without a malignant potential. Reassurance given to patient. No treatment is necessary.     History of nonmelanoma skin cancer  Personal history of melanoma  No fevers, chills, or unintended weight loss; lymph nodes examined  Well-healed scar (graft) nasal tip no evidence of recurrence  - Continue skin exams q3 mos this year, then q6 mos  - Discussed ABCDE's of nevi.  Monitor for new mole or moles that are becoming bigger, darker, irritated, or developing irregular borders. Instructed patient to observe lesion(s) for changes and follow up in clinic if changes are noted. Patient to monitor skin at home for new or changing lesions.     RTC 3 month, sooner prn

## 2024-10-16 LAB
FINAL PATHOLOGIC DIAGNOSIS: NORMAL
GROSS: NORMAL
Lab: NORMAL
MICROSCOPIC EXAM: NORMAL

## 2024-10-29 DIAGNOSIS — Z00.00 ENCOUNTER FOR MEDICARE ANNUAL WELLNESS EXAM: ICD-10-CM

## 2024-11-14 ENCOUNTER — PROCEDURE VISIT (OUTPATIENT)
Dept: DERMATOLOGY | Facility: CLINIC | Age: 89
End: 2024-11-14
Payer: MEDICARE

## 2024-11-14 DIAGNOSIS — D48.5 NEOPLASM OF UNCERTAIN BEHAVIOR OF SKIN: ICD-10-CM

## 2024-11-14 DIAGNOSIS — C44.92 SQUAMOUS CELL CARCINOMA OF SKIN: Primary | ICD-10-CM

## 2024-11-14 PROCEDURE — 11104 PUNCH BX SKIN SINGLE LESION: CPT | Mod: 59,PBBFAC | Performed by: STUDENT IN AN ORGANIZED HEALTH CARE EDUCATION/TRAINING PROGRAM

## 2024-11-14 PROCEDURE — 12032 INTMD RPR S/A/T/EXT 2.6-7.5: CPT | Mod: PBBFAC | Performed by: STUDENT IN AN ORGANIZED HEALTH CARE EDUCATION/TRAINING PROGRAM

## 2024-11-14 PROCEDURE — 11603 EXC TR-EXT MAL+MARG 2.1-3 CM: CPT | Mod: PBBFAC | Performed by: STUDENT IN AN ORGANIZED HEALTH CARE EDUCATION/TRAINING PROGRAM

## 2024-11-14 NOTE — PROGRESS NOTES
EXCISION PROCEDURE NOTE    PROCEDURE: Elliptical excision with intermediate layered repair in order to decrease dead space and close large gap.    ANESTHETIC: 12 cc 1% Xylocaine with Epinephrine 1:100,000, buffered    SURGEON: Emilia Yates M.D.    ASSISTANTS:  Irene Lucas RN    PREOPERATIVE DIAGNOSIS:  Squamous Cell Carcinoma    POSTOPERATIVE DIAGNOSIS:  Same as preoperative diagnosis    PATHOLOGIC DIAGNOSIS: Pending    LOCATION: L dorsal forearm     INITIAL LESION SIZE: 1.4 x 1.2 cm    EXCISED DIAMETER: 2.2 x 2.0 cm    PREPARATION: The diagnosis, procedure, alternatives, benefits and risks, including but not limited to: infection, bleeding/bruising, drug reactions, pain, scar or cosmetic defect, local sensation disturbances, wound dehiscence (separation of wound edges after sutures removed) and/or recurrence of present condition were explained to the patient. The patient elected to proceed.  Patient's identity was verified using 2 patient identifiers and the side and site was verified.  Time out period with surgeon, assistant and patient in surgical suite was taken.    PROCEDURE: The location noted above was prepped, draped, and anesthetized in the usual sterile fashion per  me . Lesional tissue was carefully marked with at least 4 mm margins of clinically normal skin in all directions. A fusiform elliptical excision was done with #15 blade carried down completely through the dermis into the deep subcutaneous tissues to the level of the non-muscle fascia, and dissection was carried out in that plane.  Electrocoagulation was used to obtain hemostasis. Blood loss was minimal. The wound was then approximated in a layered fashion with subcutaneous and intradermal sutures of 3.0 Monocryl, approximately 5 in number, and the wound was then superficially closed with simple interrupted sutures of 4.0 Prolene.    The patient tolerated the procedure well.    The area was cleaned and dressed appropriately and the patient  was given wound care instructions, as well as an appointment for follow-up evaluation.    LENGTH OF REPAIR: 6.0 cm    Neoplasm uncertain behavior skin  L dorsal wrist- ddx HAK v SCC v other  Punch biopsy procedure note:  Punch biopsy performed after verbal consent obtained. Area marked and prepped with alcohol. Approximately 1 cc of 1% lidocaine with epinephrine injected. 8 mm disposable punch used to remove lesion. Hemostasis obtained and biopsy site closed with 3 Prolene sutures. Wound care instructions reviewed with patient and handout given.    RTC 2 weeks s/r

## 2024-11-18 DIAGNOSIS — D09.9 SQUAMOUS CELL CARCINOMA IN SITU: Primary | ICD-10-CM

## 2024-11-18 RX ORDER — FLUOROURACIL 50 MG/G
CREAM TOPICAL
Qty: 40 G | Refills: 2 | Status: SHIPPED | OUTPATIENT
Start: 2024-11-18

## 2024-11-19 ENCOUNTER — TELEPHONE (OUTPATIENT)
Dept: DERMATOLOGY | Facility: CLINIC | Age: 89
End: 2024-11-19
Payer: MEDICARE

## 2024-11-19 NOTE — TELEPHONE ENCOUNTER
3 month appointment placed. Confirmed date and time with patient.     ----- Message from Emilia Yates MD sent at 11/18/2024  5:46 PM CST -----  Called patient to discuss biopsy results. Invasive SCC of L dorsal forearm completely excised.     Squamous cell in situ incidental at periphery of L dorsal forearm and SCCis of L dorsal wrist  Discussed we will plan for efudex cream BID to those area of scars and small area surrounding skin twice daily for 4 weeks AFTER suture removal, we will go over plan again at suture removal visit  Reviewed se local redness, skin irritation, do not apply to open skin    [facility] Team- FYI only will need to provide efudex handout and reconfirm plan at suture removal visit in case he has additional questions. Will also need 3 mo follow up scheduled.

## 2024-11-29 ENCOUNTER — CLINICAL SUPPORT (OUTPATIENT)
Dept: DERMATOLOGY | Facility: CLINIC | Age: 89
End: 2024-11-29
Payer: MEDICARE

## 2024-11-29 DIAGNOSIS — Z48.02 VISIT FOR SUTURE REMOVAL: Primary | ICD-10-CM

## 2024-11-29 NOTE — PROGRESS NOTES
Subjective:      Patient ID:  Derik Wolfe is a 89 y.o. male who presents for No chief complaint on file.    Suture Removal note:  CC: 89 y.o. male patient is here for suture removal.         HPI: Patient is s/p excision of SCC from the L dorsal wrist and L dorsal forearm on 11/14/24.  Patient reports no problems.    WOUND PE:  Sutures intact.  Wound healing well.  Good approximation of skin edges.  No signs or symptoms of infection.    IMPRESSION:    1. Skin,  Left dorsal wrist,  biopsy:   - SQUAMOUS CELL CARCINOMA IN SITU   - The tumor involves the peripheral tissue edge.  The deep tissue edge is uninvolved by tumor.    This lesion is skin cancer. You will be contacted regarding treatment.      2. Skin, left dorsal forearm, excision:   - RESIDUAL INVASIVE SQUAMOUS CELL CARCINOMA, COMPLETELY EXCISED   - INCIDENTAL SQUAMOUS CELL CARCINOMA IN SITU PRESENT AT PERIPHERAL MARGIN     - margins clear.    PLAN:  Sutures removed today.  Continue wound care.  efudex cream BID to those area of scars and small area surrounding skin twice daily for 4 weeks    RTC: In PRN months.        Review of Systems    Objective:   Physical Exam     Diagram Legend     Erythematous scaling macule/papule c/w actinic keratosis       Vascular papule c/w angioma      Pigmented verrucoid papule/plaque c/w seborrheic keratosis      Yellow umbilicated papule c/w sebaceous hyperplasia      Irregularly shaped tan macule c/w lentigo     1-2 mm smooth white papules consistent with Milia      Movable subcutaneous cyst with punctum c/w epidermal inclusion cyst      Subcutaneous movable cyst c/w pilar cyst      Firm pink to brown papule c/w dermatofibroma      Pedunculated fleshy papule(s) c/w skin tag(s)      Evenly pigmented macule c/w junctional nevus     Mildly variegated pigmented, slightly irregular-bordered macule c/w mildly atypical nevus      Flesh colored to evenly pigmented papule c/w intradermal nevus       Pink pearly papule/plaque c/w  basal cell carcinoma      Erythematous hyperkeratotic cursted plaque c/w SCC      Surgical scar with no sign of skin cancer recurrence      Open and closed comedones      Inflammatory papules and pustules      Verrucoid papule consistent consistent with wart     Erythematous eczematous patches and plaques     Dystrophic onycholytic nail with subungual debris c/w onychomycosis     Umbilicated papule    Erythematous-base heme-crusted tan verrucoid plaque consistent with inflamed seborrheic keratosis     Erythematous Silvery Scaling Plaque c/w Psoriasis     See annotation      Assessment / Plan:        There are no diagnoses linked to this encounter.         No follow-ups on file.

## 2025-01-06 ENCOUNTER — PATIENT MESSAGE (OUTPATIENT)
Dept: DERMATOLOGY | Facility: CLINIC | Age: 89
End: 2025-01-06
Payer: MEDICARE

## 2025-01-07 ENCOUNTER — TELEPHONE (OUTPATIENT)
Dept: DERMATOLOGY | Facility: CLINIC | Age: 89
End: 2025-01-07
Payer: MEDICARE

## 2025-01-07 NOTE — TELEPHONE ENCOUNTER
"Spoke with patient and informed him the efudex cream can cause skin irritation and tearing. If this occurs, hold off, and resume as instructed. Told patient to call back if any other issues may occur.    ----- Message from Flor sent at 1/6/2025 12:30 PM CST -----  Regarding: pt advice  Contact: [phone]  .Name Of Caller: Self     Contact Preference?:[phone]     What is the nature of the call?: Returning call to alfred Christiansen call      Additional Notes:  "Thank you for all that you do for our patients"  "

## 2025-01-17 RX ORDER — ATENOLOL 25 MG/1
25 TABLET ORAL
Qty: 90 TABLET | Refills: 0 | Status: SHIPPED | OUTPATIENT
Start: 2025-01-17

## 2025-01-17 NOTE — TELEPHONE ENCOUNTER
Refill Routing Note   Medication(s) are not appropriate for processing by [facility] for the following reason(s):        Required vitals abnormal    [facility] action(s):  Defer        Medication Therapy Plan: 09/17/24 (!) 159/72      Appointments  past 12m or future 3m with PCP    Date Provider   Last Visit   6/4/2024 Girish Seals MD   Next Visit   Visit date not found Girish Seals MD   ED visits in past 90 days: 0        Note composed:9:50 AM 01/17/2025

## 2025-01-17 NOTE — TELEPHONE ENCOUNTER
No care due was identified.  [facility] Embedded Care Due Messages. Reference number: [id number].   1/17/2025 7:00:50 AM CST

## 2025-02-20 ENCOUNTER — OFFICE VISIT (OUTPATIENT)
Dept: DERMATOLOGY | Facility: CLINIC | Age: 89
End: 2025-02-20
Payer: MEDICARE

## 2025-02-20 DIAGNOSIS — L57.0 AK (ACTINIC KERATOSIS): Primary | ICD-10-CM

## 2025-02-20 DIAGNOSIS — L72.0 MILIA: ICD-10-CM

## 2025-02-20 DIAGNOSIS — Z85.820 HISTORY OF MELANOMA: ICD-10-CM

## 2025-02-20 DIAGNOSIS — L82.1 SEBORRHEIC KERATOSES: ICD-10-CM

## 2025-02-20 DIAGNOSIS — Z85.828 HISTORY OF NONMELANOMA SKIN CANCER: ICD-10-CM

## 2025-02-20 PROCEDURE — 17003 DESTRUCT PREMALG LES 2-14: CPT | Mod: PBBFAC | Performed by: STUDENT IN AN ORGANIZED HEALTH CARE EDUCATION/TRAINING PROGRAM

## 2025-02-20 PROCEDURE — 17000 DESTRUCT PREMALG LESION: CPT | Mod: PBBFAC | Performed by: STUDENT IN AN ORGANIZED HEALTH CARE EDUCATION/TRAINING PROGRAM

## 2025-02-20 PROCEDURE — 99213 OFFICE O/P EST LOW 20 MIN: CPT | Mod: PBBFAC | Performed by: STUDENT IN AN ORGANIZED HEALTH CARE EDUCATION/TRAINING PROGRAM

## 2025-02-20 NOTE — PROGRESS NOTES
Subjective:      Patient ID:  Derik Wolfe is a 89 y.o. male who presents for   Chief Complaint   Patient presents with    Skin Check     TBSE      87 yo M here for follow up FBSE here with wife today    Last office visit with me on 10/14/2024 for UBSE, cryosurgery to actinic keratosis, and Bx the left forearm (SCC)     Pt reports no new moles, but has concerns over left forearm where he used efudex.  Pt notes last use was on 02/13/2025.   He used efudex BID x 4 weeks to L dorsal forearm area of recent procedure (waited until healed) and new on L dorsal wrist also spot treated areas of concern on R forearm    This is a high risk patient here to check for the development of new lesions.    Pertinent history:  History of blistering sunburns: No  History of tanning bed use: No  Family history of melanoma: Possibly, mother had skin cancer on her hands  Personal history of mole removal: Yes  Personal history of skin cancer: Yes- hx of SCC in 2020, 2018, 2014.   SCC excised from L upper arm by Dr. Hager on 10/23/20  SCC excised from R upper back in August 2018.  SCC excised from R upper nose by Dr. Jones on 6/2014.  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 Case and repair Dr. Rowland ENT 7/2024  SCC excised from Left dorsal forearm by me on 11/14/2024- residual/new SCCis at margin and bx L dorsal wrist at that visit showed SCCis- did efudex BID x 4 weeks    Did PDT 4-8-2024 to arms- some areas improved but a lot are still there  PDT 9-      Review of Systems   Skin:  Positive for daily sunscreen use (Face, hands, and arms), activity-related sunscreen use and wears hat. Negative for recent sunburn.   Hematologic/Lymphatic: Does not bruise/bleed easily.       Objective:   Physical Exam   Constitutional: He appears well-developed and well-nourished. No distress.   HENT:   Head: Normocephalic.       Neurological: He is alert and oriented to person, place, and time. He is not disoriented.   Psychiatric: He  has a normal mood and affect.   Skin:   Areas Examined (abnormalities noted in diagram):   Scalp / Hair Palpated and Inspected  Head / Face Inspection Performed  Neck Inspection Performed  Chest / Axilla Inspection Performed  Abdomen Inspection Performed  Genitals / Buttocks / Groin Inspection Performed  Back Inspection Performed  RUE Inspected  LUE Inspection Performed  RLE Inspected  LLE Inspection Performed  Nails and Digits Inspection Performed                 Diagram Legend     Erythematous scaling macule/papule c/w actinic keratosis       Vascular papule c/w angioma      Pigmented verrucoid papule/plaque c/w seborrheic keratosis      Yellow umbilicated papule c/w sebaceous hyperplasia      Irregularly shaped tan macule c/w lentigo     1-2 mm smooth white papules consistent with Milia      Movable subcutaneous cyst with punctum c/w epidermal inclusion cyst      Subcutaneous movable cyst c/w pilar cyst      Firm pink to brown papule c/w dermatofibroma      Pedunculated fleshy papule(s) c/w skin tag(s)      Evenly pigmented macule c/w junctional nevus     Mildly variegated pigmented, slightly irregular-bordered macule c/w mildly atypical nevus      Flesh colored to evenly pigmented papule c/w intradermal nevus       Pink pearly papule/plaque c/w basal cell carcinoma      Erythematous hyperkeratotic cursted plaque c/w SCC      Surgical scar with no sign of skin cancer recurrence      Open and closed comedones      Inflammatory papules and pustules      Verrucoid papule consistent consistent with wart     Erythematous eczematous patches and plaques     Dystrophic onycholytic nail with subungual debris c/w onychomycosis     Umbilicated papule    Erythematous-base heme-crusted tan verrucoid plaque consistent with inflamed seborrheic keratosis     Erythematous Silvery Scaling Plaque c/w Psoriasis     See annotation      Assessment / Plan:        AK (actinic keratosis)  Cryosurgery Procedure Note    Verbal consent from  the patient is obtained including, but not limited to, risk of hypopigmentation/hyperpigmentation, scar, recurrence of lesion. The patient is aware of the precancerous quality and need for treatment of these lesions. Liquid nitrogen cryosurgery is applied to the 4 actinic keratoses, as detailed in the physical exam, to produce a freeze injury. The patient is aware that blisters may form and is instructed on wound care with gentle cleansing and use of vaseline ointment to keep moist until healed. The patient is supplied a handout on cryosurgery and is instructed to call if lesions do not completely resolve.    Wear hat always    Seborrheic keratoses  These are benign inherited growths without a malignant potential. Reassurance given to patient. No treatment is necessary.     Milia  Reassurance given to patient. No treatment is necessary.   Treatment of benign, asymptomatic lesions may be considered cosmetic.    History of nonmelanoma skin cancer  History of melanoma  No fevers, chills, or unintended weight loss; lymph nodes examined  Well-healed scar located nose; no evidence of recurrence  - Continue skin exams q4 months  - Discussed ABCDE's of nevi.  Monitor for new mole or moles that are becoming bigger, darker, irritated, or developing irregular borders. Instructed patient to observe lesion(s) for changes and follow up in clinic if changes are noted. Patient to monitor skin at home for new or changing lesions.     RTC 3-4 months, sooner prn

## 2025-02-21 DIAGNOSIS — Z00.00 ENCOUNTER FOR MEDICARE ANNUAL WELLNESS EXAM: ICD-10-CM

## 2025-03-31 RX ORDER — ATENOLOL 25 MG/1
25 TABLET ORAL DAILY
Qty: 90 TABLET | Refills: 0 | Status: SHIPPED | OUTPATIENT
Start: 2025-03-31

## 2025-03-31 NOTE — TELEPHONE ENCOUNTER
No care due was identified.  [facility] Embedded Care Due Messages. Reference number: [id number].   3/31/2025 10:54:57 AM CDT

## 2025-04-01 NOTE — TELEPHONE ENCOUNTER
Refill Routing Note   Medication(s) are not appropriate for processing by [facility] for the following reason(s):        Required vitals abnormal    [facility] action(s):  Defer             Appointments  past 12m or future 3m with PCP    Date Provider   Last Visit   6/4/2024 Girish Seals MD   Next Visit   Visit date not found Girish Seals MD   ED visits in past 90 days: 0        Note composed:10:04 PM 03/31/2025

## 2025-06-20 ENCOUNTER — OFFICE VISIT (OUTPATIENT)
Dept: DERMATOLOGY | Facility: CLINIC | Age: 89
End: 2025-06-20
Payer: MEDICARE

## 2025-06-20 DIAGNOSIS — Z85.820 HISTORY OF MELANOMA: ICD-10-CM

## 2025-06-20 DIAGNOSIS — Z85.828 HISTORY OF NONMELANOMA SKIN CANCER: ICD-10-CM

## 2025-06-20 DIAGNOSIS — L82.1 SK (SEBORRHEIC KERATOSIS): ICD-10-CM

## 2025-06-20 DIAGNOSIS — L57.0 AK (ACTINIC KERATOSIS): Primary | ICD-10-CM

## 2025-06-20 DIAGNOSIS — B07.9 VIRAL WARTS, UNSPECIFIED TYPE: ICD-10-CM

## 2025-06-20 PROCEDURE — 99213 OFFICE O/P EST LOW 20 MIN: CPT | Mod: PBBFAC | Performed by: STUDENT IN AN ORGANIZED HEALTH CARE EDUCATION/TRAINING PROGRAM

## 2025-06-20 PROCEDURE — 99999 PR PBB SHADOW E&M-EST. PATIENT-LVL III: CPT | Mod: PBBFAC,,, | Performed by: STUDENT IN AN ORGANIZED HEALTH CARE EDUCATION/TRAINING PROGRAM

## 2025-06-20 NOTE — PROGRESS NOTES
Subjective:      Patient ID:  Derik Wolfe is a 89 y.o. male who presents for No chief complaint on file.    Derik Wolfe is a 89 y.o. male who presents for: FBSE screening exam for skin cancer.    Last office visit 2/20/2025 with me for TBSE     The patient has no specific concerns today.    Pertinent history:  History of blistering sunburns: No  History of tanning bed use: No  Family history of melanoma: Possibly, mother had skin cancer on her hands  Personal history of mole removal: Yes  Personal history of skin cancer: Yes- hx of SCC in 2020, 2018, 2014.   SCC excised from L upper arm by Dr. Hager on 10/23/20  SCC excised from R upper back in August 2018.  SCC excised from R upper nose by Dr. Jones on 6/2014.  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 Case and repair Dr. Rowland [facility] 7/2024  SCC excised from Left dorsal forearm by me on 11/14/2024- residual/new SCCis at margin and bx L dorsal wrist at that visit showed SCCis- did efudex BID x 4 weeks    Did PDT 4-8-2024 to arms- some areas improved but a lot are still there  PDT 9-      Review of Systems   Skin:  Positive for daily sunscreen use, activity-related sunscreen use and wears hat. Negative for recent sunburn.   Hematologic/Lymphatic: Does not bruise/bleed easily.       Objective:   Physical Exam   Constitutional: He appears well-developed and well-nourished. No distress.   HENT:   Head: Normocephalic.       Neurological: He is alert and oriented to person, place, and time. He is not disoriented.   Psychiatric: He has a normal mood and affect.   Skin:   Areas Examined (abnormalities noted in diagram):   Scalp / Hair Palpated and Inspected  Head / Face Inspection Performed  Neck Inspection Performed  Chest / Axilla Inspection Performed  Abdomen Inspection Performed  Genitals / Buttocks / Groin Inspection Performed  Back Inspection Performed  RUE Inspected  LUE Inspection Performed  RLE Inspected  LLE Inspection Performed  Nails and  Digits Inspection Performed                 Diagram Legend     Erythematous scaling macule/papule c/w actinic keratosis       Vascular papule c/w angioma      Pigmented verrucoid papule/plaque c/w seborrheic keratosis      Yellow umbilicated papule c/w sebaceous hyperplasia      Irregularly shaped tan macule c/w lentigo     1-2 mm smooth white papules consistent with Milia      Movable subcutaneous cyst with punctum c/w epidermal inclusion cyst      Subcutaneous movable cyst c/w pilar cyst      Firm pink to brown papule c/w dermatofibroma      Pedunculated fleshy papule(s) c/w skin tag(s)      Evenly pigmented macule c/w junctional nevus     Mildly variegated pigmented, slightly irregular-bordered macule c/w mildly atypical nevus      Flesh colored to evenly pigmented papule c/w intradermal nevus       Pink pearly papule/plaque c/w basal cell carcinoma      Erythematous hyperkeratotic cursted plaque c/w SCC      Surgical scar with no sign of skin cancer recurrence      Open and closed comedones      Inflammatory papules and pustules      Verrucoid papule consistent consistent with wart     Erythematous eczematous patches and plaques     Dystrophic onycholytic nail with subungual debris c/w onychomycosis     Umbilicated papule    Erythematous-base heme-crusted tan verrucoid plaque consistent with inflamed seborrheic keratosis     Erythematous Silvery Scaling Plaque c/w Psoriasis     See annotation      Assessment / Plan:        AK (actinic keratosis)  Cryosurgery Procedure Note    Verbal consent from the patient is obtained including, but not limited to, risk of hypopigmentation/hyperpigmentation, scar, recurrence of lesion. The patient is aware of the precancerous quality and need for treatment of these lesions. Liquid nitrogen cryosurgery is applied to the 7 actinic keratoses, as detailed in the physical exam, to produce a freeze injury. The patient is aware that blisters may form and is instructed on wound care  with gentle cleansing and use of vaseline ointment to keep moist until healed. The patient is supplied a handout on cryosurgery and is instructed to call if lesions do not completely resolve.    Pt is wearing nose protection from [employer] to protect from sun which is great  Also wearing wide hats    SK (seborrheic keratosis)  These are benign inherited growths without a malignant potential. Reassurance given to patient. No treatment is necessary.     History of nonmelanoma skin cancer  History of melanoma  No fevers, chills, or unintended weight loss; lymph nodes examined  Well-healed scar located nose; no evidence of recurrence  - Continue skin exams q4 months  - Discussed ABCDE's of nevi. Monitor for new mole or moles that are becoming bigger, darker, irritated, or developing irregular borders. Instructed patient to observe lesion(s) for changes and follow up in clinic if changes are noted. Patient to monitor skin at home for new or changing lesions.     Recommend daily sun protection/avoidance, use of at least SPF 30, broad spectrum sunscreen (OTC drug), skin self examinations, and routine physician surveillance to optimize early detection    RTC 4 mo, sooner prn

## 2025-07-14 NOTE — TELEPHONE ENCOUNTER
Care Due:                  Date            Visit Type   Department     Provider  --------------------------------------------------------------------------------                                EP -                              PRIMARY      [facility]  Last Visit: 06-      CARE ([facility])   MEDICINE       Girish Seals  Next Visit: None Scheduled  None         None Found                                                            Last  Test          Frequency    Reason                     Performed    Due Date  --------------------------------------------------------------------------------    Office Visit  15 months..  atenoloL.................  06- 08-    Health [facility] Embedded Care Due Messages. Reference number: [id number].   7/14/2025 7:01:11 AM CDT

## 2025-07-15 RX ORDER — ATENOLOL 25 MG/1
25 TABLET ORAL
Qty: 90 TABLET | Refills: 0 | Status: SHIPPED | OUTPATIENT
Start: 2025-07-15

## 2025-07-15 NOTE — TELEPHONE ENCOUNTER
Refill Routing Note   Medication(s) are not appropriate for processing by [facility] for the following reason(s):        Required vitals outdated    [facility] action(s):  Defer   Requires appointment : Yes               Appointments  past 12m or future 3m with PCP    Date Provider   Last Visit   6/4/2024 Girish Seals MD   Next Visit   Visit date not found Girish Seals MD   ED visits in past 90 days: 0        Note composed:10:18 PM 07/14/2025

## (undated) DEVICE — SUT 4-0 VICRYL / SH

## (undated) DEVICE — MARKER FN REG DUAL UTIL RULER

## (undated) DEVICE — SUT VICRYL 4-0 RB1 27IN UD

## (undated) DEVICE — GAUZE SPONGE PEANUT STRL

## (undated) DEVICE — CONTAINER SPECIMEN OR STER 4OZ

## (undated) DEVICE — GOWN SURGICAL X-LARGE

## (undated) DEVICE — COVER LIGHT HANDLE 80/CA

## (undated) DEVICE — FORCEP STRAIGHT DISP

## (undated) DEVICE — CORD BIPOLAR 12 FOOT

## (undated) DEVICE — STAPLER SKIN ROTATING HEAD

## (undated) DEVICE — ELECTRODE BLADE INSULATED 1 IN

## (undated) DEVICE — TOWEL OR DISP STRL BLUE 4/PK

## (undated) DEVICE — TRAY MINOR GEN SURG OMC

## (undated) DEVICE — SUT VICRYL PLUS 3-0 SH 18IN

## (undated) DEVICE — SUT 5/0 18IN COATED VICRYL

## (undated) DEVICE — DRAPE HALF SURGICAL 40X58IN

## (undated) DEVICE — BLADE SURG #15 CARBON STEEL

## (undated) DEVICE — ELECTRODE REM PLYHSV RETURN 9

## (undated) DEVICE — NDL HYPO REG 25G X 1 1/2

## (undated) DEVICE — TRAY SKIN SCRUB WET PREMIUM

## (undated) DEVICE — SPONGE GAUZE 16PLY 4X4

## (undated) DEVICE — DRAPE EENT SPLIT STERILE